# Patient Record
Sex: MALE | Employment: STUDENT
[De-identification: names, ages, dates, MRNs, and addresses within clinical notes are randomized per-mention and may not be internally consistent; named-entity substitution may affect disease eponyms.]

---
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
## 2022-05-31 ENCOUNTER — OPTICAL OFFICE (OUTPATIENT)
Dept: URBAN - NONMETROPOLITAN AREA CLINIC 4 | Facility: CLINIC | Age: 22
Setting detail: OPHTHALMOLOGY
End: 2022-05-31
Payer: COMMERCIAL

## 2022-05-31 ENCOUNTER — DOCTOR'S OFFICE (OUTPATIENT)
Dept: URBAN - NONMETROPOLITAN AREA CLINIC 1 | Facility: CLINIC | Age: 22
Setting detail: OPHTHALMOLOGY
End: 2022-05-31
Payer: COMMERCIAL

## 2022-05-31 DIAGNOSIS — H52.223: ICD-10-CM

## 2022-05-31 DIAGNOSIS — H52.13: ICD-10-CM

## 2022-05-31 PROCEDURE — 92014 COMPRE OPH EXAM EST PT 1/>: CPT | Performed by: OPTOMETRIST

## 2022-05-31 PROCEDURE — V2750 ANTI-REFLECTIVE COATING: HCPCS | Performed by: OPTOMETRIST

## 2022-05-31 PROCEDURE — V2744 TINT PHOTOCHROMATIC LENS/ES: HCPCS | Performed by: OPTOMETRIST

## 2022-05-31 PROCEDURE — V2784 LENS POLYCARB OR EQUAL: HCPCS | Performed by: OPTOMETRIST

## 2022-05-31 PROCEDURE — V2103 SPHEROCYLINDR 4.00D/12-2.00D: HCPCS | Performed by: OPTOMETRIST

## 2022-05-31 PROCEDURE — V2020 VISION SVCS FRAMES PURCHASES: HCPCS | Performed by: OPTOMETRIST

## 2022-05-31 PROCEDURE — V2025 EYEGLASSES DELUX FRAMES: HCPCS | Performed by: OPTOMETRIST

## 2022-05-31 PROCEDURE — V2107 SPHEROCYLINDER 4.25D/12-2D: HCPCS | Performed by: OPTOMETRIST

## 2022-05-31 ASSESSMENT — REFRACTION_MANIFEST
OU_VA: 20/20
OD_VA2: 20/80-2
OS_CYLINDER: -0.50
OS_AXIS: 175
OD_VA1: 20/80-2NI
OS_VA1: 20/20
OD_AXIS: 150
OS_SPHERE: -5.00
OS_VA2: 20/20
OD_SPHERE: -0.75
OD_CYLINDER: -1.25

## 2022-05-31 ASSESSMENT — CONFRONTATIONAL VISUAL FIELD TEST (CVF)
OD_FINDINGS: FULL
OS_FINDINGS: FULL

## 2022-05-31 ASSESSMENT — REFRACTION_AUTOREFRACTION
OS_AXIS: 018
OS_SPHERE: -5.00
OS_CYLINDER: -0.75
OD_SPHERE: -1.50
OD_AXIS: 160
OD_CYLINDER: -2.25

## 2022-05-31 ASSESSMENT — REFRACTION_CURRENTRX
OS_SPHERE: -5.00
OD_AXIS: 151
OS_VPRISM_DIRECTION: SV
OD_SPHERE: -0.75
OS_OVR_VA: 20/
OS_CYLINDER: -0.50
OD_OVR_VA: 20/
OD_VPRISM_DIRECTION: SV
OS_AXIS: 177
OD_CYLINDER: -1.25

## 2022-05-31 ASSESSMENT — TONOMETRY
OD_IOP_MMHG: 12
OS_IOP_MMHG: 12

## 2022-05-31 ASSESSMENT — SPHEQUIV_DERIVED
OD_SPHEQUIV: -1.375
OD_SPHEQUIV: -2.625
OS_SPHEQUIV: -5.25
OS_SPHEQUIV: -5.375

## 2022-05-31 ASSESSMENT — VISUAL ACUITY
OS_BCVA: 20/150+1
OD_BCVA: 20/25

## 2023-06-02 ENCOUNTER — DOCTOR'S OFFICE (OUTPATIENT)
Dept: URBAN - NONMETROPOLITAN AREA CLINIC 1 | Facility: CLINIC | Age: 23
Setting detail: OPHTHALMOLOGY
End: 2023-06-02
Payer: COMMERCIAL

## 2023-06-02 VITALS — HEIGHT: 60 IN

## 2023-06-02 DIAGNOSIS — H52.13: ICD-10-CM

## 2023-06-02 PROBLEM — E11.9 DIABETES TYPE 2 NO RETINOPATHY: Status: ACTIVE | Noted: 2023-06-02

## 2023-06-02 LAB
LEFT EYE DIABETIC RETINOPATHY: NORMAL
RIGHT EYE DIABETIC RETINOPATHY: NORMAL

## 2023-06-02 PROCEDURE — 92014 COMPRE OPH EXAM EST PT 1/>: CPT | Performed by: OPTOMETRIST

## 2023-06-02 ASSESSMENT — REFRACTION_AUTOREFRACTION
OS_SPHERE: -5.00
OD_CYLINDER: -0.75
OS_AXIS: 151
OS_CYLINDER: -0.50
OD_SPHERE: -1.25
OD_AXIS: 126

## 2023-06-02 ASSESSMENT — REFRACTION_MANIFEST
OD_SPHERE: -0.75
OS_VA2: 20/20
OD_VA2: 20/80-2
OS_AXIS: 175
OU_VA: 20/20
OS_CYLINDER: -0.50
OD_AXIS: 150
OS_VA1: 20/20
OD_VA1: 20/80-2
OD_CYLINDER: -1.00
OS_SPHERE: -5.00

## 2023-06-02 ASSESSMENT — CONFRONTATIONAL VISUAL FIELD TEST (CVF)
OS_FINDINGS: FULL
OD_FINDINGS: FULL

## 2023-06-02 ASSESSMENT — REFRACTION_CURRENTRX
OS_CYLINDER: -0.50
OD_CYLINDER: -1.25
OS_AXIS: 177
OS_SPHERE: -5.00
OS_VPRISM_DIRECTION: SV
OD_SPHERE: -0.75
OD_OVR_VA: 20/
OS_OVR_VA: 20/
OD_AXIS: 151
OD_VPRISM_DIRECTION: SV

## 2023-06-02 ASSESSMENT — SPHEQUIV_DERIVED
OS_SPHEQUIV: -5.25
OD_SPHEQUIV: -1.625
OD_SPHEQUIV: -1.25
OS_SPHEQUIV: -5.25

## 2023-06-02 ASSESSMENT — VISUAL ACUITY
OD_BCVA: 20/20
OS_BCVA: 20/200+1

## 2023-06-02 ASSESSMENT — TONOMETRY
OD_IOP_MMHG: 14
OS_IOP_MMHG: 14

## 2023-08-21 ENCOUNTER — HOSPITAL ENCOUNTER (INPATIENT)
Facility: HOSPITAL | Age: 23
LOS: 4 days | Discharge: HOME | DRG: 469 | End: 2023-08-25
Attending: EMERGENCY MEDICINE | Admitting: FAMILY MEDICINE
Payer: COMMERCIAL

## 2023-08-21 DIAGNOSIS — R73.9 HYPERGLYCEMIA: Primary | ICD-10-CM

## 2023-08-21 DIAGNOSIS — R55 SYNCOPE: ICD-10-CM

## 2023-08-21 DIAGNOSIS — N17.9 AKI (ACUTE KIDNEY INJURY) (HCC): ICD-10-CM

## 2023-08-21 DIAGNOSIS — E11.9 TYPE 2 DIABETES MELLITUS (HCC): ICD-10-CM

## 2023-08-21 DIAGNOSIS — R74.01 TRANSAMINITIS: ICD-10-CM

## 2023-08-21 DIAGNOSIS — E11.00 HYPEROSMOLAR HYPERGLYCEMIC STATE (HHS) (HCC): ICD-10-CM

## 2023-08-21 DIAGNOSIS — E87.0 HYPERNATREMIA: ICD-10-CM

## 2023-08-21 DIAGNOSIS — E23.2 DI (DIABETES INSIPIDUS) (HCC): ICD-10-CM

## 2023-08-21 LAB
ALBUMIN SERPL BCP-MCNC: 4.5 G/DL (ref 3.5–5)
ALP SERPL-CCNC: 87 U/L (ref 34–104)
ALT SERPL W P-5'-P-CCNC: 85 U/L (ref 7–52)
ANION GAP SERPL CALCULATED.3IONS-SCNC: 13 MMOL/L
AST SERPL W P-5'-P-CCNC: 61 U/L (ref 13–39)
BACTERIA UR QL AUTO: NORMAL /HPF
BASE EX.OXY STD BLDV CALC-SCNC: 82.3 % (ref 60–80)
BASE EXCESS BLDV CALC-SCNC: 2.1 MMOL/L
BASOPHILS # BLD AUTO: 0.11 THOUSANDS/ÂΜL (ref 0–0.1)
BASOPHILS NFR BLD AUTO: 1 % (ref 0–1)
BETA-HYDROXYBUTYRATE: 0.5 MMOL/L
BILIRUB SERPL-MCNC: 0.59 MG/DL (ref 0.2–1)
BILIRUB UR QL STRIP: NEGATIVE
BUN SERPL-MCNC: 23 MG/DL (ref 5–25)
CALCIUM SERPL-MCNC: 10.3 MG/DL (ref 8.4–10.2)
CARDIAC TROPONIN I PNL SERPL HS: 7 NG/L
CHLORIDE SERPL-SCNC: 96 MMOL/L (ref 96–108)
CLARITY UR: CLEAR
CO2 SERPL-SCNC: 26 MMOL/L (ref 21–32)
COLOR UR: YELLOW
CREAT SERPL-MCNC: 1.41 MG/DL (ref 0.6–1.3)
EOSINOPHIL # BLD AUTO: 0.16 THOUSAND/ÂΜL (ref 0–0.61)
EOSINOPHIL NFR BLD AUTO: 2 % (ref 0–6)
ERYTHROCYTE [DISTWIDTH] IN BLOOD BY AUTOMATED COUNT: 12.2 % (ref 11.6–15.1)
GFR SERPL CREATININE-BSD FRML MDRD: 71 ML/MIN/1.73SQ M
GLUCOSE SERPL-MCNC: 900 MG/DL (ref 65–140)
GLUCOSE SERPL-MCNC: >500 MG/DL (ref 65–140)
GLUCOSE SERPL-MCNC: >500 MG/DL (ref 65–140)
GLUCOSE UR STRIP-MCNC: ABNORMAL MG/DL
HCO3 BLDV-SCNC: 27.6 MMOL/L (ref 24–30)
HCT VFR BLD AUTO: 47.2 % (ref 36.5–49.3)
HGB BLD-MCNC: 16.2 G/DL (ref 12–17)
HGB UR QL STRIP.AUTO: ABNORMAL
IMM GRANULOCYTES # BLD AUTO: 0.04 THOUSAND/UL (ref 0–0.2)
IMM GRANULOCYTES NFR BLD AUTO: 0 % (ref 0–2)
KETONES UR STRIP-MCNC: NEGATIVE MG/DL
LEUKOCYTE ESTERASE UR QL STRIP: ABNORMAL
LYMPHOCYTES # BLD AUTO: 3.16 THOUSANDS/ÂΜL (ref 0.6–4.47)
LYMPHOCYTES NFR BLD AUTO: 32 % (ref 14–44)
MAGNESIUM SERPL-MCNC: 2.5 MG/DL (ref 1.9–2.7)
MCH RBC QN AUTO: 31.8 PG (ref 26.8–34.3)
MCHC RBC AUTO-ENTMCNC: 34.3 G/DL (ref 31.4–37.4)
MCV RBC AUTO: 93 FL (ref 82–98)
MONOCYTES # BLD AUTO: 0.97 THOUSAND/ÂΜL (ref 0.17–1.22)
MONOCYTES NFR BLD AUTO: 10 % (ref 4–12)
NEUTROPHILS # BLD AUTO: 5.55 THOUSANDS/ÂΜL (ref 1.85–7.62)
NEUTS SEG NFR BLD AUTO: 55 % (ref 43–75)
NITRITE UR QL STRIP: NEGATIVE
NON-SQ EPI CELLS URNS QL MICRO: NORMAL /HPF
NRBC BLD AUTO-RTO: 0 /100 WBCS
O2 CT BLDV-SCNC: 19.7 ML/DL
PCO2 BLDV: 45.6 MM HG (ref 42–50)
PH BLDV: 7.4 [PH] (ref 7.3–7.4)
PH UR STRIP.AUTO: 5.5 [PH]
PLATELET # BLD AUTO: 231 THOUSANDS/UL (ref 149–390)
PMV BLD AUTO: 11.4 FL (ref 8.9–12.7)
PO2 BLDV: 48 MM HG (ref 35–45)
POTASSIUM SERPL-SCNC: 3.9 MMOL/L (ref 3.5–5.3)
PROT SERPL-MCNC: 8.1 G/DL (ref 6.4–8.4)
PROT UR STRIP-MCNC: NEGATIVE MG/DL
RBC # BLD AUTO: 5.09 MILLION/UL (ref 3.88–5.62)
RBC #/AREA URNS AUTO: NORMAL /HPF
SODIUM SERPL-SCNC: 135 MMOL/L (ref 135–147)
SP GR UR STRIP.AUTO: <=1.005 (ref 1–1.03)
UROBILINOGEN UR QL STRIP.AUTO: 0.2 E.U./DL
WBC # BLD AUTO: 9.99 THOUSAND/UL (ref 4.31–10.16)
WBC #/AREA URNS AUTO: NORMAL /HPF

## 2023-08-21 PROCEDURE — 84484 ASSAY OF TROPONIN QUANT: CPT | Performed by: EMERGENCY MEDICINE

## 2023-08-21 PROCEDURE — 36415 COLL VENOUS BLD VENIPUNCTURE: CPT | Performed by: EMERGENCY MEDICINE

## 2023-08-21 PROCEDURE — 83735 ASSAY OF MAGNESIUM: CPT | Performed by: EMERGENCY MEDICINE

## 2023-08-21 PROCEDURE — 81001 URINALYSIS AUTO W/SCOPE: CPT | Performed by: EMERGENCY MEDICINE

## 2023-08-21 PROCEDURE — 82010 KETONE BODYS QUAN: CPT | Performed by: EMERGENCY MEDICINE

## 2023-08-21 PROCEDURE — 93005 ELECTROCARDIOGRAM TRACING: CPT

## 2023-08-21 PROCEDURE — 96367 TX/PROPH/DG ADDL SEQ IV INF: CPT

## 2023-08-21 PROCEDURE — 82948 REAGENT STRIP/BLOOD GLUCOSE: CPT

## 2023-08-21 PROCEDURE — 82805 BLOOD GASES W/O2 SATURATION: CPT | Performed by: EMERGENCY MEDICINE

## 2023-08-21 PROCEDURE — 96365 THER/PROPH/DIAG IV INF INIT: CPT

## 2023-08-21 PROCEDURE — 85025 COMPLETE CBC W/AUTO DIFF WBC: CPT | Performed by: EMERGENCY MEDICINE

## 2023-08-21 PROCEDURE — 80053 COMPREHEN METABOLIC PANEL: CPT | Performed by: EMERGENCY MEDICINE

## 2023-08-21 PROCEDURE — 99291 CRITICAL CARE FIRST HOUR: CPT | Performed by: EMERGENCY MEDICINE

## 2023-08-21 PROCEDURE — 99284 EMERGENCY DEPT VISIT MOD MDM: CPT

## 2023-08-21 RX ADMIN — SODIUM CHLORIDE 10 UNITS/HR: 9 INJECTION, SOLUTION INTRAVENOUS at 23:07

## 2023-08-21 RX ADMIN — SODIUM CHLORIDE, SODIUM LACTATE, POTASSIUM CHLORIDE, AND CALCIUM CHLORIDE 1000 ML: .6; .31; .03; .02 INJECTION, SOLUTION INTRAVENOUS at 21:51

## 2023-08-21 RX ADMIN — INSULIN HUMAN 10 UNITS: 100 INJECTION, SOLUTION PARENTERAL at 23:09

## 2023-08-21 RX ADMIN — SODIUM CHLORIDE 1000 ML: 0.9 INJECTION, SOLUTION INTRAVENOUS at 23:04

## 2023-08-22 PROBLEM — R00.0 SINUS TACHYCARDIA: Status: ACTIVE | Noted: 2023-08-22

## 2023-08-22 PROBLEM — R55 NEAR SYNCOPE: Status: ACTIVE | Noted: 2023-08-22

## 2023-08-22 LAB
ALBUMIN SERPL BCP-MCNC: 3.8 G/DL (ref 3.5–5)
ALP SERPL-CCNC: 74 U/L (ref 34–104)
ALT SERPL W P-5'-P-CCNC: 65 U/L (ref 7–52)
ANION GAP SERPL CALCULATED.3IONS-SCNC: 7 MMOL/L
ANION GAP SERPL CALCULATED.3IONS-SCNC: 8 MMOL/L
AST SERPL W P-5'-P-CCNC: 49 U/L (ref 13–39)
ATRIAL RATE: 122 BPM
BASOPHILS # BLD AUTO: 0.09 THOUSANDS/ÂΜL (ref 0–0.1)
BASOPHILS NFR BLD AUTO: 1 % (ref 0–1)
BILIRUB SERPL-MCNC: 0.49 MG/DL (ref 0.2–1)
BUN SERPL-MCNC: 12 MG/DL (ref 5–25)
BUN SERPL-MCNC: 14 MG/DL (ref 5–25)
CALCIUM SERPL-MCNC: 8.9 MG/DL (ref 8.4–10.2)
CALCIUM SERPL-MCNC: 9.1 MG/DL (ref 8.4–10.2)
CHLORIDE SERPL-SCNC: 110 MMOL/L (ref 96–108)
CHLORIDE SERPL-SCNC: 112 MMOL/L (ref 96–108)
CO2 SERPL-SCNC: 29 MMOL/L (ref 21–32)
CO2 SERPL-SCNC: 31 MMOL/L (ref 21–32)
CREAT SERPL-MCNC: 0.89 MG/DL (ref 0.6–1.3)
CREAT SERPL-MCNC: 0.92 MG/DL (ref 0.6–1.3)
EOSINOPHIL # BLD AUTO: 0.27 THOUSAND/ÂΜL (ref 0–0.61)
EOSINOPHIL NFR BLD AUTO: 3 % (ref 0–6)
ERYTHROCYTE [DISTWIDTH] IN BLOOD BY AUTOMATED COUNT: 12.3 % (ref 11.6–15.1)
GFR SERPL CREATININE-BSD FRML MDRD: 119 ML/MIN/1.73SQ M
GFR SERPL CREATININE-BSD FRML MDRD: 123 ML/MIN/1.73SQ M
GLUCOSE SERPL-MCNC: 253 MG/DL (ref 65–140)
GLUCOSE SERPL-MCNC: 262 MG/DL (ref 65–140)
GLUCOSE SERPL-MCNC: 263 MG/DL (ref 65–140)
GLUCOSE SERPL-MCNC: 267 MG/DL (ref 65–140)
GLUCOSE SERPL-MCNC: 291 MG/DL (ref 65–140)
GLUCOSE SERPL-MCNC: 332 MG/DL (ref 65–140)
GLUCOSE SERPL-MCNC: 335 MG/DL (ref 65–140)
GLUCOSE SERPL-MCNC: 341 MG/DL (ref 65–140)
GLUCOSE SERPL-MCNC: 361 MG/DL (ref 65–140)
GLUCOSE SERPL-MCNC: 399 MG/DL (ref 65–140)
GLUCOSE SERPL-MCNC: 450 MG/DL (ref 65–140)
GLUCOSE SERPL-MCNC: >500 MG/DL (ref 65–140)
HCT VFR BLD AUTO: 42.9 % (ref 36.5–49.3)
HGB BLD-MCNC: 14.4 G/DL (ref 12–17)
IMM GRANULOCYTES # BLD AUTO: 0.02 THOUSAND/UL (ref 0–0.2)
IMM GRANULOCYTES NFR BLD AUTO: 0 % (ref 0–2)
LYMPHOCYTES # BLD AUTO: 3.35 THOUSANDS/ÂΜL (ref 0.6–4.47)
LYMPHOCYTES NFR BLD AUTO: 37 % (ref 14–44)
MCH RBC QN AUTO: 31.3 PG (ref 26.8–34.3)
MCHC RBC AUTO-ENTMCNC: 33.6 G/DL (ref 31.4–37.4)
MCV RBC AUTO: 93 FL (ref 82–98)
MONOCYTES # BLD AUTO: 1 THOUSAND/ÂΜL (ref 0.17–1.22)
MONOCYTES NFR BLD AUTO: 11 % (ref 4–12)
NEUTROPHILS # BLD AUTO: 4.38 THOUSANDS/ÂΜL (ref 1.85–7.62)
NEUTS SEG NFR BLD AUTO: 48 % (ref 43–75)
NRBC BLD AUTO-RTO: 0 /100 WBCS
P AXIS: 16 DEGREES
PLATELET # BLD AUTO: 181 THOUSANDS/UL (ref 149–390)
PMV BLD AUTO: 11.2 FL (ref 8.9–12.7)
POTASSIUM SERPL-SCNC: 3.4 MMOL/L (ref 3.5–5.3)
POTASSIUM SERPL-SCNC: 3.5 MMOL/L (ref 3.5–5.3)
PR INTERVAL: 122 MS
PROT SERPL-MCNC: 6.6 G/DL (ref 6.4–8.4)
QRS AXIS: 33 DEGREES
QRSD INTERVAL: 76 MS
QT INTERVAL: 302 MS
QTC INTERVAL: 430 MS
RBC # BLD AUTO: 4.6 MILLION/UL (ref 3.88–5.62)
SODIUM SERPL-SCNC: 146 MMOL/L (ref 135–147)
SODIUM SERPL-SCNC: 151 MMOL/L (ref 135–147)
T WAVE AXIS: 27 DEGREES
TSH SERPL DL<=0.05 MIU/L-ACNC: 3.12 UIU/ML (ref 0.45–4.5)
VENTRICULAR RATE: 122 BPM
WBC # BLD AUTO: 9.11 THOUSAND/UL (ref 4.31–10.16)

## 2023-08-22 PROCEDURE — 80048 BASIC METABOLIC PNL TOTAL CA: CPT | Performed by: FAMILY MEDICINE

## 2023-08-22 PROCEDURE — 99223 1ST HOSP IP/OBS HIGH 75: CPT | Performed by: FAMILY MEDICINE

## 2023-08-22 PROCEDURE — 80053 COMPREHEN METABOLIC PANEL: CPT

## 2023-08-22 PROCEDURE — 82948 REAGENT STRIP/BLOOD GLUCOSE: CPT

## 2023-08-22 PROCEDURE — 84443 ASSAY THYROID STIM HORMONE: CPT

## 2023-08-22 PROCEDURE — 85025 COMPLETE CBC W/AUTO DIFF WBC: CPT

## 2023-08-22 RX ORDER — INSULIN GLARGINE 100 [IU]/ML
13 INJECTION, SOLUTION SUBCUTANEOUS ONCE
Status: COMPLETED | OUTPATIENT
Start: 2023-08-22 | End: 2023-08-22

## 2023-08-22 RX ORDER — INSULIN LISPRO 100 [IU]/ML
2-12 INJECTION, SOLUTION INTRAVENOUS; SUBCUTANEOUS
Status: DISCONTINUED | OUTPATIENT
Start: 2023-08-22 | End: 2023-08-23

## 2023-08-22 RX ORDER — INSULIN GLARGINE 100 [IU]/ML
36 INJECTION, SOLUTION SUBCUTANEOUS
Status: DISCONTINUED | OUTPATIENT
Start: 2023-08-22 | End: 2023-08-22

## 2023-08-22 RX ORDER — INSULIN GLARGINE 100 [IU]/ML
13 INJECTION, SOLUTION SUBCUTANEOUS
Status: DISCONTINUED | OUTPATIENT
Start: 2023-08-22 | End: 2023-08-22

## 2023-08-22 RX ORDER — INSULIN LISPRO 100 [IU]/ML
2-12 INJECTION, SOLUTION INTRAVENOUS; SUBCUTANEOUS
Status: DISCONTINUED | OUTPATIENT
Start: 2023-08-22 | End: 2023-08-22

## 2023-08-22 RX ORDER — INSULIN LISPRO 100 [IU]/ML
9 INJECTION, SOLUTION INTRAVENOUS; SUBCUTANEOUS
Status: DISCONTINUED | OUTPATIENT
Start: 2023-08-22 | End: 2023-08-22

## 2023-08-22 RX ORDER — POTASSIUM CHLORIDE 20 MEQ/1
40 TABLET, EXTENDED RELEASE ORAL ONCE
Status: COMPLETED | OUTPATIENT
Start: 2023-08-22 | End: 2023-08-22

## 2023-08-22 RX ORDER — ACETAMINOPHEN 325 MG/1
650 TABLET ORAL EVERY 6 HOURS PRN
Status: DISCONTINUED | OUTPATIENT
Start: 2023-08-22 | End: 2023-08-25 | Stop reason: HOSPADM

## 2023-08-22 RX ORDER — INSULIN LISPRO 100 [IU]/ML
9 INJECTION, SOLUTION INTRAVENOUS; SUBCUTANEOUS
Status: DISCONTINUED | OUTPATIENT
Start: 2023-08-23 | End: 2023-08-22

## 2023-08-22 RX ORDER — INSULIN GLARGINE 100 [IU]/ML
45 INJECTION, SOLUTION SUBCUTANEOUS
Status: DISCONTINUED | OUTPATIENT
Start: 2023-08-23 | End: 2023-08-23

## 2023-08-22 RX ORDER — POTASSIUM CHLORIDE 20 MEQ/1
20 TABLET, EXTENDED RELEASE ORAL ONCE
Status: COMPLETED | OUTPATIENT
Start: 2023-08-22 | End: 2023-08-22

## 2023-08-22 RX ORDER — INSULIN LISPRO 100 [IU]/ML
15 INJECTION, SOLUTION INTRAVENOUS; SUBCUTANEOUS
Status: DISCONTINUED | OUTPATIENT
Start: 2023-08-23 | End: 2023-08-23

## 2023-08-22 RX ORDER — INSULIN LISPRO 100 [IU]/ML
1-6 INJECTION, SOLUTION INTRAVENOUS; SUBCUTANEOUS
Status: DISCONTINUED | OUTPATIENT
Start: 2023-08-22 | End: 2023-08-22

## 2023-08-22 RX ORDER — INSULIN LISPRO 100 [IU]/ML
10 INJECTION, SOLUTION INTRAVENOUS; SUBCUTANEOUS
Status: DISCONTINUED | OUTPATIENT
Start: 2023-08-22 | End: 2023-08-22

## 2023-08-22 RX ORDER — INSULIN LISPRO 100 [IU]/ML
1-6 INJECTION, SOLUTION INTRAVENOUS; SUBCUTANEOUS
Status: DISCONTINUED | OUTPATIENT
Start: 2023-08-22 | End: 2023-08-23

## 2023-08-22 RX ORDER — DEXTROSE AND SODIUM CHLORIDE 5; .45 G/100ML; G/100ML
200 INJECTION, SOLUTION INTRAVENOUS CONTINUOUS
Status: DISCONTINUED | OUTPATIENT
Start: 2023-08-22 | End: 2023-08-22

## 2023-08-22 RX ORDER — INSULIN LISPRO 100 [IU]/ML
12 INJECTION, SOLUTION INTRAVENOUS; SUBCUTANEOUS
Status: DISCONTINUED | OUTPATIENT
Start: 2023-08-23 | End: 2023-08-22

## 2023-08-22 RX ORDER — TESTOSTERONE CYPIONATE 100 MG/ML
100 VIAL (ML) INTRAMUSCULAR
COMMUNITY
Start: 2019-09-19

## 2023-08-22 RX ORDER — SODIUM CHLORIDE 450 MG/100ML
150 INJECTION, SOLUTION INTRAVENOUS CONTINUOUS
Status: DISCONTINUED | OUTPATIENT
Start: 2023-08-22 | End: 2023-08-24

## 2023-08-22 RX ORDER — SODIUM CHLORIDE, SODIUM GLUCONATE, SODIUM ACETATE, POTASSIUM CHLORIDE, MAGNESIUM CHLORIDE, SODIUM PHOSPHATE, DIBASIC, AND POTASSIUM PHOSPHATE .53; .5; .37; .037; .03; .012; .00082 G/100ML; G/100ML; G/100ML; G/100ML; G/100ML; G/100ML; G/100ML
150 INJECTION, SOLUTION INTRAVENOUS CONTINUOUS
Status: DISCONTINUED | OUTPATIENT
Start: 2023-08-22 | End: 2023-08-22

## 2023-08-22 RX ORDER — INSULIN GLARGINE 100 [IU]/ML
30 INJECTION, SOLUTION SUBCUTANEOUS
Status: DISCONTINUED | OUTPATIENT
Start: 2023-08-22 | End: 2023-08-22

## 2023-08-22 RX ORDER — ONDANSETRON 2 MG/ML
4 INJECTION INTRAMUSCULAR; INTRAVENOUS EVERY 6 HOURS PRN
Status: DISCONTINUED | OUTPATIENT
Start: 2023-08-22 | End: 2023-08-25 | Stop reason: HOSPADM

## 2023-08-22 RX ADMIN — POTASSIUM CHLORIDE 40 MEQ: 1500 TABLET, EXTENDED RELEASE ORAL at 07:50

## 2023-08-22 RX ADMIN — INSULIN GLARGINE 36 UNITS: 100 INJECTION, SOLUTION SUBCUTANEOUS at 21:57

## 2023-08-22 RX ADMIN — SODIUM CHLORIDE, SODIUM GLUCONATE, SODIUM ACETATE, POTASSIUM CHLORIDE, MAGNESIUM CHLORIDE, SODIUM PHOSPHATE, DIBASIC, AND POTASSIUM PHOSPHATE 150 ML/HR: .53; .5; .37; .037; .03; .012; .00082 INJECTION, SOLUTION INTRAVENOUS at 01:07

## 2023-08-22 RX ADMIN — INSULIN LISPRO 6 UNITS: 100 INJECTION, SOLUTION INTRAVENOUS; SUBCUTANEOUS at 09:12

## 2023-08-22 RX ADMIN — INSULIN LISPRO 9 UNITS: 100 INJECTION, SOLUTION INTRAVENOUS; SUBCUTANEOUS at 12:10

## 2023-08-22 RX ADMIN — INSULIN LISPRO 10 UNITS: 100 INJECTION, SOLUTION INTRAVENOUS; SUBCUTANEOUS at 16:45

## 2023-08-22 RX ADMIN — SODIUM CHLORIDE 125 ML/HR: 0.45 INJECTION, SOLUTION INTRAVENOUS at 09:11

## 2023-08-22 RX ADMIN — DEXTROSE AND SODIUM CHLORIDE 200 ML/HR: 5; .45 INJECTION, SOLUTION INTRAVENOUS at 07:50

## 2023-08-22 RX ADMIN — INSULIN GLARGINE 13 UNITS: 100 INJECTION, SOLUTION SUBCUTANEOUS at 09:10

## 2023-08-22 RX ADMIN — SODIUM CHLORIDE 125 ML/HR: 0.45 INJECTION, SOLUTION INTRAVENOUS at 17:10

## 2023-08-22 RX ADMIN — INSULIN LISPRO 6 UNITS: 100 INJECTION, SOLUTION INTRAVENOUS; SUBCUTANEOUS at 16:45

## 2023-08-22 RX ADMIN — INSULIN LISPRO 3 UNITS: 100 INJECTION, SOLUTION INTRAVENOUS; SUBCUTANEOUS at 21:57

## 2023-08-22 RX ADMIN — POTASSIUM CHLORIDE 20 MEQ: 1500 TABLET, EXTENDED RELEASE ORAL at 01:18

## 2023-08-22 RX ADMIN — INSULIN LISPRO 5 UNITS: 100 INJECTION, SOLUTION INTRAVENOUS; SUBCUTANEOUS at 12:10

## 2023-08-22 NOTE — CASE MANAGEMENT
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[address])    • Transaminitis    • Schwannoma    • JOSH (acute kidney injury) ([address])       LOS (days): 1  Geometric Mean LOS (GMLOS) (days):   Days to GMLOS:     OBJECTIVE:    Risk of Unplanned Readmission Score: 6.03         Current admission status: Inpatient  Referral Reason:  (post acute care needs)    Preferred Pharmacy:   [address] [place] [address]  Phone: [phone] Fax: [phone]    Primary Care Provider: Shahriar Egan MD    Primary Insurance: [facility]  Secondary Insurance:       Admit with HHS, currently on IV fluids    CM met with patient and his Mom at the bedside,baseline information  was obtained. CM discussed the role of CM in helping the patient develop a discharge plan and assist the patient in carry out their plan. ASSESSMENT:  Active Health Care Proxies    There are no active Health Care Proxies on file.        Advance Directives  Does patient have a [facility]?: No  Was patient offered paperwork?: No  Does patient currently have a Health Care decision maker?: Yes, please see Health Care Proxy section  Does patient have Advance Directives?: No  Was patient offered paperwork?: No  Primary Contact: Jackie Marie (Mother)   [phone]         Readmission Root Cause  30 Day Readmission: No    Patient Information  Admitted from[de-identified] Home  Mental Status: Alert  During Assessment patient was accompanied by: Parent  Assessment information provided by[de-identified] Patient  Primary Caregiver: Self  Support Systems: Parent, Family members  [place] of Residence: [address] [place] [address] do you live in?: [facility]/ [facility] entry access options. Select all that apply.: Stairs  Number of steps to enter home.: 1  Type of Current Residence: 3 story home  Upon entering residence, is there a bedroom on the main floor (no further steps)?: No  A bedroom is located on the following floor levels of residence (select all that apply):: 2nd Floor  Upon entering residence, is there a bathroom on the main floor (no further steps)?: No  Indicate which floors of current residence have a bathroom (select all the apply):: 2nd Floor  Number of steps to 2nd floor from main floor: One Flight  In the last 12 months, was there a time when you were not able to pay the mortgage or rent on time?: No  In the last 12 months, how many places have you lived?: 1  In the last 12 months, was there a time when you did not have a steady place to sleep or slept in a shelter (including now)?: No  Homeless/housing insecurity resource given?: No  Living Arrangements: Lives w/ Parent(s), Other (Comment) (About to start his Jr year at [employer])  Is patient a ?: No    Activities of Daily Living Prior to Admission  Functional Status: Independent  Completes ADLs independently?: Yes  Ambulates independently?: Yes  Does patient use assisted devices?: No  Does patient currently own DME?: No  Does patient have a history of Outpatient Therapy (PT/OT)?: No  Does the patient have a history of Short-Term Rehab?: No  Does patient have a history of HHC?: No  Does patient currently have [facility]?: No         Patient Information Continued  Income Source:  Other (Comment) (Student)  Does patient have prescription coverage?: Yes  Within the past 12 months, you worried that your food would run out before you got the money to buy more.: Never true  Within the past 12 months, the food you bought just didn't last and you didn't have money to get more.: Never true  Food insecurity resource given?: No  Does patient receive dialysis treatments?: No  Does patient have a history of substance abuse?: No  Does patient have a history of Mental Health Diagnosis?: No         Means of Transportation  Means of Transport to Appts[de-identified] Drives Self  In the past 12 months, has lack of transportation kept you from medical appointments or from getting medications?: No  In the past 12 months, has lack of transportation kept you from meetings, work, or from getting things needed for daily living?: No  Was application for public transport provided?: No    Patient is to start his [employer] year at [employer] this week. He does have a endocrine MD at [facility] Dr Simone Lowe who has been managing his growth hormone. PCP is Dr Brittany Chew, Mother is aware patient will be transition to an adult PCP in the next few months, as pt will be turning 26years old. DISCHARGE DETAILS:    Discharge planning discussed with[de-identified] patient and mother  Freedom of Choice: Yes     CM contacted family/caregiver?: Yes             Contacts  Patient Contacts: Hyun Khan, Mother  Relationship to Patient[de-identified] Family  Contact Method: In Person  Reason/Outcome: Continuity of Care, Discharge [facility]         Is the patient interested in [address] at discharge?: No    DME Referral Provided  Referral made for DME?: No              Treatment Team Recommendation: Home  Discharge Destination Plan[de-identified] Home         CM will follow for his dc needs. ? Needing insulin and a glucometer. Per his insurance glucometer will be One touch.

## 2023-08-22 NOTE — ASSESSMENT & PLAN NOTE
· Baseline appears to be 0.7 , creat 1.41 on admission  · Suspect due to dehydration in the setting of HHS. Also on Metformin.    · Continue IV fluid hydration   · Trend labs   · Urine retention protocol   · Avoid nephrotoxins, hypotension

## 2023-08-22 NOTE — ASSESSMENT & PLAN NOTE
· Follows with [facility] endocrinology   · Takes testosterone injections 2x monthly - hold while inpatient

## 2023-08-22 NOTE — ASSESSMENT & PLAN NOTE
· -120 . EKG shows sinus tachycardia   · Patient denies chest pain , palpitation, sob. · Afebrile.  UA negative for infection  · Suspect from dehydration - continue IVF hydration   · Check TSH , covid

## 2023-08-22 NOTE — ASSESSMENT & PLAN NOTE
· Patient reports near syncopal event while outside watching a baseball game. · EKG sinus tachy -asymptomatic   · No neuro deficit on exam.   · Suspect due to hyperglycemia and dehydration   · Continue to correct hyperglycemia. Continue IVF hydration .

## 2023-08-22 NOTE — UTILIZATION REVIEW
NOTIFICATION OF INPATIENT ADMISSION   AUTHORIZATION REQUEST   SERVICING FACILITY:   [address]  Tax ID: [id number]  NPI: [id number] ATTENDING PROVIDER:  Attending Name and NPI#: Cate Herring Md [[id number]]  Address: [address] [place] [address]  Phone: [phone]   ADMISSION INFORMATION:  Place of Service: Inpatient [address]  Place of Service Code: 21  Inpatient Admission Date/Time: 8/21/23 11:30 PM  Discharge Date/Time: No discharge date for patient encounter. Admitting Diagnosis Code/Description:  Syncope [R55]  Fatigue [R53.83]  Hyperglycemia [R73.9]  JOSH (acute kidney injury) ([address]) [N17.9]     UTILIZATION REVIEW CONTACT:  Susie Albarran Utilization   Network Utilization Review Department  Phone: [phone]  Fax [phone]  Email: [email or web address]  Contact for approvals/pending authorizations, clinical reviews, and discharge. PHYSICIAN ADVISORY SERVICES:  Medical Necessity Denial & [facility] Review  Phone: [phone]  Fax: [phone]  Email: [email or web address]

## 2023-08-22 NOTE — ASSESSMENT & PLAN NOTE
Lab Results   Component Value Date    HGBA1C 8.8 (H) 07/03/2023       Recent Labs     08/21/23  2129 08/21/23  2306 08/22/23  0024 08/22/23  0101   POCGLU >500* >500* 450* 399*       Blood Sugar Average: Last 72 hrs:  (P) 424.5     · Presents after near syncopal episode while watching brothers soccer game. Reports fatigue and feeling generally unwell x 1 week. In ED, found to have blood sugar of 900. Hx DM2 on metformin for 2 years, does not check blood sugars at home. · Not in DKA- anion gap normal. VBG normal pH. · Continue insulin gtt   · S/P 2L IVF bolus. Continue aggressive IV fluid hydration .    · Consult endocrine   · Monitor blood sugars closely- have been improving

## 2023-08-22 NOTE — H&P
[facility]  H&P  Name: Kwabena Hernandez 23 y.o. male I MRN: [id number]  Unit/Bed#: [facility] I Date of Admission: 8/21/2023   Date of Service: 8/22/2023 I Hospital Day: 1      Assessment/Plan   * Hyperosmolar hyperglycemic state (HHS) [facility])  Assessment & Plan  Lab Results   Component Value Date    HGBA1C 8.8 (H) 07/03/2023       Recent Labs     08/21/23  2129 08/21/23  2306 08/22/23  0024 08/22/23  0101   POCGLU >500* >500* 450* 399*       Blood Sugar Average: Last 72 hrs:  (P) 424.5     · Presents after near syncopal episode while watching brothers soccer game. Reports fatigue and feeling generally unwell x 1 week. In ED, found to have blood sugar of 900. Hx DM2 on metformin for 2 years, does not check blood sugars at home. · Not in DKA- anion gap normal. VBG normal pH. · Continue insulin gtt   · S/P 2L IVF bolus. Continue aggressive IV fluid hydration . · Consult endocrine   · Monitor blood sugars closely- have been improving     JOSH (acute kidney injury) ([address])  Assessment & Plan  · Baseline appears to be 0.7 , creat 1.41 on admission  · Suspect due to dehydration in the setting of HHS. Also on Metformin. · Continue IV fluid hydration   · Trend labs   · Urine retention protocol   · Avoid nephrotoxins, hypotension      Near syncope  Assessment & Plan  · Patient reports near syncopal event while outside watching a baseball game. · EKG sinus tachy -asymptomatic   · No neuro deficit on exam.   · Suspect due to hyperglycemia and dehydration   · Continue to correct hyperglycemia. Continue IVF hydration . Sinus tachycardia  Assessment & Plan  · -120 . EKG shows sinus tachycardia   · Patient denies chest pain , palpitation, sob. · Afebrile.  UA negative for infection  · Suspect from dehydration - continue IVF hydration   · Check TSH , covid     Schwannoma  Assessment & Plan  · Hx schwannoma L3-L5 , s/p surgery in 2017   · Follows with neurosurgery Transaminitis  Assessment & Plan  · Mildly elevated AST/ALT , T bili normal (does appear improved from prior labs in 2021)  · Denies abdominal pain. Did have prior episode of nausea and vomiting . · Possible from testosterone use? 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 Code Status: Level 1 - Full Code   Discussion with family: Updated  (mother) at bedside. Anticipated Length of Stay: Patient will be admitted on an inpatient basis with an anticipated length of stay of greater than 2 midnights secondary to HHS, camryn - insulin gtt, ivf, labs. Total Time Spent on Date of Encounter in care of patient: 75 minutes This time was spent on one or more of the following: performing physical exam; counseling and coordination of care; obtaining or reviewing history; documenting in the medical record; reviewing/ordering tests, medications or procedures; communicating with other healthcare professionals and discussing with patient's family/caregivers. Chief Complaint: near syncope    History of Present Illness:  Jerry Swain is a 23 y.o. male with a PMH of type 2 diabetes, obesity, growth hormone deficiency, schwannoma, morning glory optic disc anomaly who presents after near syncopal episode at his brother's baseball game. Patient reports he developed blurry vision and family reports he "fell asleep" , patient denies any loss of consciousness or fall.   Mother reports they used grandfathers blood sugar monitor and the reading was "high ". patient reports for about 1 week he has been feeling fatigued and generally unwell, had episodes of nausea and vomiting. Does report polydipsia at baseline, not new or worsening. Reports diagnosed with diabetes 2 years ago, at that time started on metformin. Reports he does not check blood sugars and does not have a meter . Recently had HgbA1c completed but was not aware of results per mother. [facility] , not diabetes management. Patient denies fevers, chills, chest pain, sob, abdomdinal pain, diarrhea. Review of Systems:  Review of Systems   Constitutional: Positive for activity change and fatigue. Negative for chills and fever. Respiratory: Negative for cough and shortness of breath. Cardiovascular: Negative for chest pain, palpitations and leg swelling. Gastrointestinal: Positive for nausea and vomiting. Negative for abdominal pain and diarrhea. Endocrine: Positive for polydipsia. Genitourinary: Negative for difficulty urinating and dysuria. Neurological: Negative for dizziness, weakness, light-headedness, numbness and headaches. No blurry vision or neuro symptoms on exam   All other systems reviewed and are negative. Past Medical and Surgical History:   Past Medical History:   Diagnosis Date   • Diabetes mellitus ([address])        Past Surgical History:   Procedure Laterality Date   • BACK SURGERY         Meds/Allergies:  Prior to Admission medications    Medication Sig Start Date End Date Taking? Authorizing Provider   metFORMIN (GLUCOPHAGE) 500 mg tablet Take 500 mg by mouth 2 (two) times a day with meals   Yes Historical Provider, MD   Testosterone Cypionate 100 MG/ML SOLN 100 mg by Intramuscular (multi inj) route every 14 (fourteen) days 9/19/19  Yes Historical Provider, MD     I have reviewed home medications with patient personally.     Allergies: No Known Allergies    Social History:  Marital Status: Single     Patient Pre-hospital Living Situation: Home  Patient Pre-hospital Level of Mobility: walks  Patient Pre-hospital Diet Restrictions: none  Substance Use History:   Social History     Substance and Sexual Activity   Alcohol Use None     Social History     Tobacco Use   Smoking Status Never   Smokeless Tobacco Never     Social History     Substance and Sexual Activity   Drug Use Never       Family History:  History reviewed. No pertinent family history. Physical Exam:     Vitals:   Blood Pressure: 135/86 (08/22/23 0018)  Pulse: (!) 120 (08/22/23 0018)  Temperature: 98.8 °F (37.1 °C) (08/22/23 0018)  Temp Source: Temporal (08/22/23 0018)  Respirations: 18 (08/22/23 0018)  Height: 5' 11" (180.3 cm) (08/22/23 0018)  Weight - Scale: 109 kg (240 lb 11.9 oz) (08/22/23 0018)  SpO2: 92 % (08/22/23 0018)    Physical Exam  Vitals and nursing note reviewed. Constitutional:       General: He is not in acute distress. Appearance: Normal appearance. He is not ill-appearing, toxic-appearing or diaphoretic. HENT:      Head: Normocephalic and atraumatic. Mouth/Throat:      Mouth: Mucous membranes are dry. Cardiovascular:      Rate and Rhythm: Regular rhythm. Tachycardia present. Pulses: Normal pulses. Heart sounds: Normal heart sounds. Pulmonary:      Effort: Pulmonary effort is normal. No respiratory distress. Breath sounds: Normal breath sounds. No wheezing, rhonchi or rales. Abdominal:      General: Bowel sounds are normal. There is no distension. Palpations: Abdomen is soft. Tenderness: There is no abdominal tenderness. There is no guarding or rebound. Musculoskeletal:         General: Normal range of motion. Cervical back: Normal range of motion. Right lower leg: No edema. Left lower leg: No edema. Skin:     General: Skin is warm and dry. Neurological:      General: No focal deficit present. Mental Status: He is alert and oriented to person, place, and time.           Additional Data:     Lab Results:  Results from last 7 days   Lab Units 08/21/23  2147   WBC Thousand/uL 9.99   HEMOGLOBIN g/dL 16.2   HEMATOCRIT % 47.2   PLATELETS Thousands/uL 231   NEUTROS PCT % 55   LYMPHS PCT % 32   MONOS PCT % 10   EOS PCT % 2     Results from last 7 days   Lab Units 08/21/23  2147   SODIUM mmol/L 135   POTASSIUM mmol/L 3.9   CHLORIDE mmol/L 96   CO2 mmol/L 26   BUN mg/dL 23   CREATININE mg/dL 1.41*   ANION GAP mmol/L 13   CALCIUM mg/dL 10.3*   ALBUMIN g/dL 4.5   TOTAL BILIRUBIN mg/dL 0.59   ALK PHOS U/L 87   ALT U/L 85*   AST U/L 61*   GLUCOSE RANDOM mg/dL 900*         Results from last 7 days   Lab Units 08/22/23  0101 08/22/23  0024 08/21/23  2306 08/21/23  2129   POC GLUCOSE mg/dl 399* 450* >500* >500*               Lines/Drains:  Invasive Devices     Peripheral Intravenous Line  Duration           Peripheral IV 08/21/23 Dorsal (posterior); Left Hand <1 day    Peripheral IV 08/21/23 Right Antecubital <1 day                    Imaging: No pertinent imaging reviewed. No orders to display       EKG and Other Studies Reviewed on Admission:   · EKG: Sinus Tachycardia. HR 110s. ** Please Note: This note has been constructed using a voice recognition system.  **

## 2023-08-22 NOTE — PLAN OF CARE
Problem: PAIN - ADULT  Goal: Verbalizes/displays adequate comfort level or baseline comfort level  Description: Interventions:  - Encourage patient to monitor pain and request assistance  - Assess pain using appropriate pain scale  - Administer analgesics based on type and severity of pain and evaluate response  - Implement non-pharmacological measures as appropriate and evaluate response  - Consider cultural and social influences on pain and pain management  - Notify physician/advanced practitioner if interventions unsuccessful or patient reports new pain  Outcome: Progressing     Problem: INFECTION - ADULT  Goal: Absence or prevention of progression during hospitalization  Description: INTERVENTIONS:  - Assess and monitor for signs and symptoms of infection  - Monitor lab/diagnostic results  - Monitor all insertion sites, i.e. indwelling lines, tubes, and drains  - Monitor endotracheal if appropriate and nasal secretions for changes in amount and color  - [place] appropriate cooling/warming therapies per order  - Administer medications as ordered  - Instruct and encourage patient and family to use good hand hygiene technique  - Identify and instruct in appropriate isolation precautions for identified infection/condition  Outcome: Progressing  Goal: Absence of fever/infection during neutropenic period  Description: INTERVENTIONS:  - Monitor WBC    Outcome: Progressing     Problem: SAFETY ADULT  Goal: Patient will remain free of falls  Description: INTERVENTIONS:  - Educate patient/family on patient safety including physical limitations  - Instruct patient to call for assistance with activity   - Consult OT/PT to assist with strengthening/mobility   - Keep Call bell within reach  - Keep bed low and locked with side rails adjusted as appropriate  - Keep care items and personal belongings within reach  - Initiate and maintain comfort rounds  - Make Fall Risk Sign visible to staff  - Offer Toileting every    Hours, in advance of need  - Initiate/Maintain   alarm  - Obtain necessary fall risk management equipment:     - Apply yellow socks and bracelet for high fall risk patients  - Consider moving patient to room near nurses station  Outcome: Progressing  Goal: Maintain or return to baseline ADL function  Description: INTERVENTIONS:  -  Assess patient's ability to carry out ADLs; assess patient's baseline for ADL function and identify physical deficits which impact ability to perform ADLs (bathing, care of mouth/teeth, toileting, grooming, dressing, etc.)  - Assess/evaluate cause of self-care deficits   - Assess range of motion  - Assess patient's mobility; develop plan if impaired  - Assess patient's need for assistive devices and provide as appropriate  - Encourage maximum independence but intervene and supervise when necessary  - Involve family in performance of ADLs  - Assess for home care needs following discharge   - Consider OT consult to assist with ADL evaluation and planning for discharge  - Provide patient education as appropriate  Outcome: Progressing  Goal: Maintains/Returns to pre admission functional level  Description: INTERVENTIONS:  - Perform BMAT or MOVE assessment daily.   - Set and communicate daily mobility goal to care team and patient/family/caregiver. - Collaborate with rehabilitation services on mobility goals if consulted  - Perform Range of Motion    times a day. - Reposition patient every    hours.   - Dangle patient    times a day  - Stand patient    times a day  - Ambulate patient    times a day  - Out of bed to chair    times a day   - Out of bed for meals            times a day  - Out of bed for toileting  - Record patient progress and toleration of activity level   Outcome: Progressing     Problem: DISCHARGE PLANNING  Goal: Discharge to home or other facility with appropriate resources  Description: INTERVENTIONS:  - Identify barriers to discharge w/patient and caregiver  - Arrange for needed discharge resources and transportation as appropriate  - Identify discharge learning needs (meds, wound care, etc.)  - Arrange for interpretive services to assist at discharge as needed  - Refer to Case Management Department for coordinating discharge planning if the patient needs post-hospital services based on physician/advanced practitioner order or complex needs related to functional status, cognitive ability, or social support system  Outcome: Progressing     Problem: Knowledge Deficit  Goal: Patient/family/caregiver demonstrates understanding of disease process, treatment plan, medications, and discharge instructions  Description: Complete learning assessment and assess knowledge base. Interventions:  - Provide teaching at level of understanding  - Provide teaching via preferred learning methods  Outcome: Progressing     Problem: Nutrition/Hydration-ADULT  Goal: Nutrient/Hydration intake appropriate for improving, restoring or maintaining nutritional needs  Description: Monitor and assess patient's nutrition/hydration status for malnutrition. Collaborate with interdisciplinary team and initiate plan and interventions as ordered. Monitor patient's weight and dietary intake as ordered or per policy. Utilize nutrition screening tool and intervene as necessary. Determine patient's food preferences and provide high-protein, high-caloric foods as appropriate.      INTERVENTIONS:  - Monitor oral intake, urinary output, labs, and treatment plans  - Assess nutrition and hydration status and recommend course of action  - Evaluate amount of meals eaten  - Assist patient with eating if necessary   - Allow adequate time for meals  - Recommend/ encourage appropriate diets, oral nutritional supplements, and vitamin/mineral supplements  - Order, calculate, and assess calorie counts as needed  - Recommend, monitor, and adjust tube feedings and TPN/PPN based on assessed needs  - Assess need for intravenous fluids  - Provide specific nutrition/hydration education as appropriate  - Include patient/family/caregiver in decisions related to nutrition  Outcome: Progressing

## 2023-08-22 NOTE — ASSESSMENT & PLAN NOTE
· Mildly elevated AST/ALT , T bili normal (does appear improved from prior labs in 2021)  · Denies abdominal pain. Did have prior episode of nausea and vomiting . · Possible from testosterone use?   · Repeat CMP in AM

## 2023-08-22 NOTE — PROGRESS NOTES
Pt reports good appetite at this time/baseline. Reports skipping breakfast each day. Lunch and dinner he eats though times vary greatly when he is home from college. Lunch and dinner typically comprise of hotdogs or red meat or chicken with french fries or roasted potatoes. Pt likes most all kinds of fruits, would eat an entire 1lb box of strawberries at a time if he desired. Drinks at least 32 oz of 1 or 2% milk daily along with diet soda or crystal light or powerade or propel water. Limited nonstarchy vegetable intake, likes cucumbers, peppers, mushrooms, carrots, onions only. Pt reports previously following with an RD through [facility] though didn't find the appointments helpful, wanted more literature and specific instructions. Pt also appears to have limited desire to change his behaviors, mother encouraging changes as her mother  of diabetic induced coma and her father is having kidney failure from uncontrolled DM. Provided diet instruction to pt with mother present. Discussed appropriate portion sizes of fruit and discussed balance snacks, to have with source of healthy fats and protein such as no sugar added peanut butter which pt enjoys, plans to have 1 small apple with 2 tablespoons peanut butter or 1 cup celery with 2 tablespoons peanut butter for snack option. Discussed decrease intake of milk to 8 oz per meal or 8 oz with snack. Encouraged water as primary beverage, to flavor with fresh lemon or True Lemon powder. Encouraged intake of nonstarchy vegetables though pt does appear resistant to trying this. Provided with Portions Booklet, Diabetes and You, Carb Counting for DM, Label Reading Tips, Plate Method handouts and RD contact information. Would recommend OP MNT referral as pt is agreeable, pt prefers idea of trying somewhere local and establishing with different RD. Continue CCD 2 as ordered.

## 2023-08-22 NOTE — ED PROVIDER NOTES
History  Chief Complaint   Patient presents with   • Fatigue     Patient reports that he has been feeling fatigued for the past few days and states he has been "heat intolerant", also states that he took his BP at home and the cuff said he was in a-fib and that his blood sugar was reading high on his grandfather's meter       History provided by:  Medical records, patient and parent (Mother)  Medical Problem  Location:  Weakness, fatigue  Severity:  Severe  Onset quality:  Gradual  Duration:  2 weeks  Timing:  Constant  Progression:  Worsening  Chronicity:  New  Context:  History of diabetes, known to [facility] endocrinology, currently only taking metformin, patient has noted polydipsia, polyuria, polyphagia, blurred vision over the past several weeks. Today at brother's soccer scrimmage patient had near syncopal event  Relieved by:  Nothing  Worsened by:  Nothing  Ineffective treatments:  None tried. Patient checked his blood sugar on his grandfathers machine and read high  Associated symptoms: fatigue    Associated symptoms: no abdominal pain, no chest pain, no cough, no ear pain, no fever, no headaches, no nausea, no rash, no rhinorrhea, no shortness of breath, no sore throat and no vomiting        Prior to Admission Medications   Prescriptions Last Dose Informant Patient Reported? Taking? Testosterone Cypionate 100 MG/ML SOLN 8/15/2023  Yes Yes   Si mg by Intramuscular (multi inj) route every 14 (fourteen) days   metFORMIN (GLUCOPHAGE) 500 mg tablet 2023  Yes Yes   Sig: Take 500 mg by mouth 2 (two) times a day with meals      Facility-Administered Medications: None       Past Medical History:   Diagnosis Date   • Diabetes mellitus ([address])        Past Surgical History:   Procedure Laterality Date   • BACK SURGERY         History reviewed. No pertinent family history. I have reviewed and agree with the history as documented.     E-Cigarette/Vaping     E-Cigarette/Vaping Substances     Social History Tobacco Use   • Smoking status: Never   • Smokeless tobacco: Never   Substance Use Topics   • Drug use: Never       Review of Systems   Constitutional: Positive for activity change, appetite change and fatigue. Negative for chills and fever. HENT: Negative for ear pain, rhinorrhea, sore throat and trouble swallowing. Eyes: Negative for pain, discharge and visual disturbance. Respiratory: Negative for cough, chest tightness and shortness of breath. Cardiovascular: Negative for chest pain and palpitations. Gastrointestinal: Negative for abdominal pain, nausea and vomiting. Endocrine: Positive for polydipsia, polyphagia and polyuria. Genitourinary: Negative for difficulty urinating, dysuria, hematuria and testicular pain. Musculoskeletal: Negative for arthralgias and back pain. Skin: Negative for color change and rash. Allergic/Immunologic: Negative for immunocompromised state. Neurological: Positive for syncope. Negative for dizziness, seizures, weakness and headaches. Hematological: Negative for adenopathy. Psychiatric/Behavioral: Negative for confusion and dysphoric mood. All other systems reviewed and are negative. Physical Exam  Physical Exam  Vitals and nursing note reviewed. Constitutional:       General: He is not in acute distress. Appearance: Normal appearance. He is obese. He is not ill-appearing, toxic-appearing or diaphoretic. HENT:      Head: Normocephalic and atraumatic. Nose: Nose normal. No congestion or rhinorrhea. Mouth/Throat:      Mouth: Mucous membranes are moist.      Pharynx: Oropharynx is clear. No oropharyngeal exudate or posterior oropharyngeal erythema. Eyes:      General:         Right eye: No discharge. Left eye: No discharge. Extraocular Movements: Extraocular movements intact. Conjunctiva/sclera: Conjunctivae normal.      Pupils: Pupils are equal, round, and reactive to light.       Comments: Disconjugate gaze, chronic   Cardiovascular:      Rate and Rhythm: Normal rate and regular rhythm. Pulses: Normal pulses. Heart sounds: Normal heart sounds. No murmur heard. No gallop. Pulmonary:      Effort: Pulmonary effort is normal. No respiratory distress. Breath sounds: Normal breath sounds. No stridor. No wheezing, rhonchi or rales. Chest:      Chest wall: No tenderness. Abdominal:      General: Bowel sounds are normal. There is no distension. Palpations: Abdomen is soft. There is no mass. Tenderness: There is no abdominal tenderness. There is no right CVA tenderness, left CVA tenderness, guarding or rebound. Hernia: No hernia is present. Musculoskeletal:         General: Normal range of motion. Cervical back: Normal range of motion and neck supple. Skin:     General: Skin is warm and dry. Capillary Refill: Capillary refill takes less than 2 seconds. Neurological:      General: No focal deficit present. Mental Status: He is alert and oriented to person, place, and time. Cranial Nerves: No cranial nerve deficit. Sensory: No sensory deficit. Motor: No weakness. Coordination: Coordination normal.      Gait: Gait normal.      Deep Tendon Reflexes: Reflexes normal.   Psychiatric:         Mood and Affect: Mood normal.         Behavior: Behavior normal.         Thought Content:  Thought content normal.         Judgment: Judgment normal.         Vital Signs  ED Triage Vitals   Temperature Pulse Respirations Blood Pressure SpO2   08/21/23 2128 08/21/23 2128 08/21/23 2128 08/21/23 2128 08/21/23 2128   98.9 °F (37.2 °C) (!) 114 18 132/90 100 %      Temp Source Heart Rate Source Patient Position - Orthostatic VS BP Location FiO2 (%)   08/21/23 2128 08/21/23 2128 08/21/23 2128 08/21/23 2128 --   Oral Monitor Sitting Left arm       Pain Score       08/22/23 0015       No Pain           Vitals:    08/21/23 2345 08/22/23 0000 08/22/23 0017 08/22/23 0018   BP: 137/88 116/78 135/86 135/86   Pulse: (!) 116 (!) 122  (!) 120   Patient Position - Orthostatic VS:    Lying         Visual Acuity      ED Medications  Medications   insulin regular (HumuLIN R,NovoLIN R) 1 Units/mL in sodium chloride 0.9 % 100 mL infusion (9 Units/hr Intravenous Rate/Dose Change 8/22/23 0102)   multi-electrolyte (PLASMALYTE-A/ISOLYTE-S PH 7.4) IV solution (150 mL/hr Intravenous New Bag 8/22/23 0107)   acetaminophen (TYLENOL) tablet 650 mg (has no administration in time range)   ondansetron (ZOFRAN) injection 4 mg (has no administration in time range)   lactated ringers bolus 1,000 mL (0 mL Intravenous Stopped 8/21/23 2311)   sodium chloride 0.9 % bolus 1,000 mL (0 mL Intravenous Stopped 8/22/23 0005)   insulin regular (HumuLIN R,NovoLIN R) injection 10 Units (10 Units Intravenous Given 8/21/23 2309)   potassium chloride (K-DUR,KLOR-CON) CR tablet 20 mEq (20 mEq Oral Given 8/22/23 0118)       Diagnostic Studies  Results Reviewed     Procedure Component Value Units Date/Time    Fingerstick Glucose (POCT) [[id number]]  (Abnormal) Collected: 08/21/23 2306    Lab Status: Final result Updated: 08/21/23 2311     POC Glucose >500 mg/dl     Urine Microscopic [[id number]]  (Normal) Collected: 08/21/23 2209    Lab Status: Final result Specimen: Urine, Clean Catch Updated: 08/21/23 2249     RBC, UA 0-1 /hpf      WBC, UA 0-1 /hpf      Epithelial Cells Occasional /hpf      Bacteria, UA Occasional /hpf     UA (URINE) with reflex to Scope [[id number]]  (Abnormal) Collected: 08/21/23 2209    Lab Status: Final result Specimen: Urine, Clean Catch Updated: 08/21/23 2228     Color, UA Yellow     Clarity, UA Clear     Specific Gravity, UA <=1.005     pH, UA 5.5     Leukocytes, UA Elevated glucose may cause decreased leukocyte values.  See urine microscopic for UWBC result     Nitrite, UA Negative     Protein, UA Negative mg/dl      Glucose, UA >=1000 (1%) mg/dl      Ketones, UA Negative mg/dl      Urobilinogen, UA 0.2 E.U./dl Bilirubin, UA Negative     Occult Blood, UA Trace-Intact    Comprehensive metabolic panel [[id number]]  (Abnormal) Collected: 08/21/23 2147    Lab Status: Final result Specimen: Blood from Arm, Right Updated: 08/21/23 2222     Sodium 135 mmol/L      Potassium 3.9 mmol/L      Chloride 96 mmol/L      CO2 26 mmol/L      ANION GAP 13 mmol/L      BUN 23 mg/dL      Creatinine 1.41 mg/dL      Glucose 900 mg/dL      Calcium 10.3 mg/dL      AST 61 U/L      ALT 85 U/L      Alkaline Phosphatase 87 U/L      Total Protein 8.1 g/dL      Albumin 4.5 g/dL      Total Bilirubin 0.59 mg/dL      eGFR 71 ml/min/1.73sq m     Narrative:      [place] guidelines for Chronic Kidney Disease (CKD):   •  Stage 1 with normal or high GFR (GFR > 90 mL/min/1.73 square meters)  •  Stage 2 Mild CKD (GFR = 60-89 mL/min/1.73 square meters)  •  Stage 3A Moderate CKD (GFR = 45-59 mL/min/1.73 square meters)  •  Stage 3B Moderate CKD (GFR = 30-44 mL/min/1.73 square meters)  •  Stage 4 Severe CKD (GFR = 15-29 mL/min/1.73 square meters)  •  Stage 5 End Stage CKD (GFR <15 mL/min/1.73 square meters)  Note: GFR calculation is accurate only with a steady state creatinine    Magnesium [[id number]]  (Normal) Collected: 08/21/23 2147    Lab Status: Final result Specimen: Blood from Arm, Right Updated: 08/21/23 2220     Magnesium 2.5 mg/dL     HS Troponin 0hr (reflex protocol) [[id number]]  (Normal) Collected: 08/21/23 2147    Lab Status: Final result Specimen: Blood from Arm, Right Updated: 08/21/23 2215     hs TnI 0hr 7 ng/L     Beta Hydroxybutyrate [[id number]]  (Normal) Collected: 08/21/23 2147    Lab Status: Final result Specimen: Blood from Arm, Right Updated: 08/21/23 2213     BETA-HYDROXYBUTYRATE 0.5 mmol/L     Blood gas, venous [[id number]]  (Abnormal) Collected: 08/21/23 2147    Lab Status: Final result Specimen: Blood from Arm, Right Updated: 08/21/23 2159     pH, Thomas 7.400     pCO2, Thomas 45.6 mm Hg      pO2, Thomas 48.0 mm Hg HCO3, Thomas 27.6 mmol/L      Base Excess, Thomas 2.1 mmol/L      O2 Content, Thomas 19.7 ml/dL      O2 HGB, VENOUS 82.3 %     CBC and differential [[id number]]  (Abnormal) Collected: 08/21/23 2147    Lab Status: Final result Specimen: Blood from Arm, Right Updated: 08/21/23 2153     WBC 9.99 Thousand/uL      RBC 5.09 Million/uL      Hemoglobin 16.2 g/dL      Hematocrit 47.2 %      MCV 93 fL      MCH 31.8 pg      MCHC 34.3 g/dL      RDW 12.2 %      MPV 11.4 fL      Platelets 378 Thousands/uL      nRBC 0 /100 WBCs      Neutrophils Relative 55 %      Immat GRANS % 0 %      Lymphocytes Relative 32 %      Monocytes Relative 10 %      Eosinophils Relative 2 %      Basophils Relative 1 %      Neutrophils Absolute 5.55 Thousands/µL      Immature Grans Absolute 0.04 Thousand/uL      Lymphocytes Absolute 3.16 Thousands/µL      Monocytes Absolute 0.97 Thousand/µL      Eosinophils Absolute 0.16 Thousand/µL      Basophils Absolute 0.11 Thousands/µL     Fingerstick Glucose (POCT) [[id number]]  (Abnormal) Collected: 08/21/23 2129    Lab Status: Final result Updated: 08/21/23 2130     POC Glucose >500 mg/dl                  No orders to display              Procedures  CriticalCare Time    Date/Time: 8/22/2023 1:46 AM    Performed by: Temi Lechuga MD  Authorized by: Temi Lechuga MD    Critical care provider statement:     Critical care time (minutes):  35    Critical care was necessary to treat or prevent imminent or life-threatening deterioration of the following conditions:  Endocrine crisis    Critical care was time spent personally by me on the following activities:  Interpretation of cardiac output measurements, ordering and performing treatments and interventions, ordering and review of laboratory studies, ordering and review of radiographic studies, re-evaluation of patient's condition, review of old charts, examination of patient, evaluation of patient's response to treatment, discussions with consultants, development of treatment plan with patient or surrogate and obtaining history from patient or surrogate    I assumed direction of critical care for this patient from another provider in my specialty: no               ED Course         DUNG    Flowsheet Row Most Recent Value   DUNG Initial Screen: During the past 12 months, did you:    1. Drink any alcohol (more than a few sips)? No Filed at: 08/21/2023 2217   2. Smoke any marijuana or hashish No Filed at: 08/21/2023 2217   3. Use anything else to get high? ("anything else" includes illegal drugs, over the counter and prescription drugs, and things that you sniff or 'garza')? No Filed at: 08/21/2023 2217                                          Medical Decision Making  2120: Patient appears well, vital signs reviewed. Patient has been noticing his of hyperglycemia. History of diabetes but only takes metformin, states he has never picked up a Accu-Chek machine. Patient appears mildly dehydrated on exam.  Accu-Chek reading high. Plan to complete basic labs including beta hydroxybutyrate, VBG, cardiac enzymes. Check EKG. I will rehydrate with IV fluids. Anticipate need for hyperglycemia control. 2229: Labs reviewed. Severe hyperglycemia. Continue IV hydration. I will start on IV insulin. I will consult medicine for admission. JOSH (acute kidney injury) ([address]): acute illness or injury  Hyperglycemia: acute illness or injury  Syncope: acute illness or injury  Amount and/or Complexity of Data Reviewed  Labs: ordered. ECG/medicine tests: ordered and independent interpretation performed. Details: Sinus tachycardia 122 bpm, no acute ischemia      Risk  OTC drugs. Decision regarding hospitalization.           Disposition  Final diagnoses:   Hyperglycemia   Syncope   JOSH (acute kidney injury) ([address])     Time reflects when diagnosis was documented in both MDM as applicable and the Disposition within this note     Time User Action Codes Description Comment    8/21/2023 10:30 PM Jazmin Ochoa [R73.9] Hyperglycemia     8/21/2023 10:30 PM Jazmin Ochoa [R55] Syncope     8/21/2023 10:30 PM Brandon Leal [N17.9] JOSH (acute kidney injury) ([address])     8/22/2023  1:09 AM Karen Luis [E11.00] Hyperosmolar hyperglycemic state ([facility]) [facility])       ED Disposition     ED Disposition   Admit    Condition   Stable    Date/Time   Mon Aug 21, 2023 10:29 PM    Comment              Follow-up Information    None         Current Discharge Medication List      CONTINUE these medications which have NOT CHANGED    Details   metFORMIN (GLUCOPHAGE) 500 mg tablet Take 500 mg by mouth 2 (two) times a day with meals      Testosterone Cypionate 100 MG/ML SOLN 100 mg by Intramuscular (multi inj) route every 14 (fourteen) days             No discharge procedures on file.     PDMP Review     None          ED Provider  Electronically Signed by           Sheryl Murrell MD  08/22/23 0559

## 2023-08-22 NOTE — PLAN OF CARE
Problem: PAIN - ADULT  Goal: Verbalizes/displays adequate comfort level or baseline comfort level  Description: Interventions:  - Encourage patient to monitor pain and request assistance  - Assess pain using appropriate pain scale  - Administer analgesics based on type and severity of pain and evaluate response  - Implement non-pharmacological measures as appropriate and evaluate response  - Consider cultural and social influences on pain and pain management  - Notify physician/advanced practitioner if interventions unsuccessful or patient reports new pain  Outcome: Progressing     Problem: INFECTION - ADULT  Goal: Absence or prevention of progression during hospitalization  Description: INTERVENTIONS:  - Assess and monitor for signs and symptoms of infection  - Monitor lab/diagnostic results  - Monitor all insertion sites, i.e. indwelling lines, tubes, and drains  - Monitor endotracheal if appropriate and nasal secretions for changes in amount and color  - [place] appropriate cooling/warming therapies per order  - Administer medications as ordered  - Instruct and encourage patient and family to use good hand hygiene technique  - Identify and instruct in appropriate isolation precautions for identified infection/condition  Outcome: Progressing  Goal: Absence of fever/infection during neutropenic period  Description: INTERVENTIONS:  - Monitor WBC    Outcome: Progressing     Problem: SAFETY ADULT  Goal: Patient will remain free of falls  Description: INTERVENTIONS:  - Educate patient/family on patient safety including physical limitations  - Instruct patient to call for assistance with activity   - Consult OT/PT to assist with strengthening/mobility   - Keep Call bell within reach  - Keep bed low and locked with side rails adjusted as appropriate  - Keep care items and personal belongings within reach  - Initiate and maintain comfort rounds  - Make Fall Risk Sign visible to staff  - Offer Toileting every  Hours, in advance of need  - Initiate/Maintain alarm  - Obtain necessary fall risk management equipment:   - Apply yellow socks and bracelet for high fall risk patients  - Consider moving patient to room near nurses station  Outcome: Progressing  Goal: Maintain or return to baseline ADL function  Description: INTERVENTIONS:  -  Assess patient's ability to carry out ADLs; assess patient's baseline for ADL function and identify physical deficits which impact ability to perform ADLs (bathing, care of mouth/teeth, toileting, grooming, dressing, etc.)  - Assess/evaluate cause of self-care deficits   - Assess range of motion  - Assess patient's mobility; develop plan if impaired  - Assess patient's need for assistive devices and provide as appropriate  - Encourage maximum independence but intervene and supervise when necessary  - Involve family in performance of ADLs  - Assess for home care needs following discharge   - Consider OT consult to assist with ADL evaluation and planning for discharge  - Provide patient education as appropriate  Outcome: Progressing  Goal: Maintains/Returns to pre admission functional level  Description: INTERVENTIONS:  - Perform BMAT or MOVE assessment daily.   - Set and communicate daily mobility goal to care team and patient/family/caregiver. - Collaborate with rehabilitation services on mobility goals if consulted  - Perform Range of Motion  times a day. - Reposition patient every hours.   - Dangle patient  times a day  - Stand patient  times a day  - Ambulate patient  times a day  - Out of bed to chair  times a day   - Out of bed for meals  times a day  - Out of bed for toileting  - Record patient progress and toleration of activity level   Outcome: Progressing     Problem: DISCHARGE PLANNING  Goal: Discharge to home or other facility with appropriate resources  Description: INTERVENTIONS:  - Identify barriers to discharge w/patient and caregiver  - Arrange for needed discharge resources and transportation as appropriate  - Identify discharge learning needs (meds, wound care, etc.)  - Arrange for interpretive services to assist at discharge as needed  - Refer to Case Management Department for coordinating discharge planning if the patient needs post-hospital services based on physician/advanced practitioner order or complex needs related to functional status, cognitive ability, or social support system  Outcome: Progressing     Problem: Knowledge Deficit  Goal: Patient/family/caregiver demonstrates understanding of disease process, treatment plan, medications, and discharge instructions  Description: Complete learning assessment and assess knowledge base.   Interventions:  - Provide teaching at level of understanding  - Provide teaching via preferred learning methods  Outcome: Progressing

## 2023-08-22 NOTE — UTILIZATION REVIEW
Initial Clinical Review    Admission: Date/Time/Statement:   Admission Orders (From admission, onward)     Ordered        08/21/23 2330  INPATIENT ADMISSION  Once                      Orders Placed This Encounter   Procedures   • INPATIENT ADMISSION     Standing Status:   Standing     Number of Occurrences:   1     Order Specific Question:   Level of Care     Answer:   Med Surg [16]     Order Specific Question:   Estimated length of stay     Answer:   More than 2 Midnights     Order Specific Question:   Certification     Answer:   I certify that inpatient services are medically necessary for this patient for a duration of greater than two midnights. See H&P and MD Progress Notes for additional information about the patient's course of treatment. ED Arrival Information     Expected   -    Arrival   8/21/2023 21:16    Acuity   Urgent            Means of arrival   Walk-In    Escorted by   Family Member    Service   Hospitalist    Admission type   Emergency            Arrival complaint   blurred vision/elevated sugar             Chief Complaint   Patient presents with   • Fatigue     Patient reports that he has been feeling fatigued for the past few days and states he has been "heat intolerant", also states that he took his BP at home and the cuff said he was in a-fib and that his blood sugar was reading high on his grandfather's meter       Initial Presentation: 23 y.o. male to ED via walk-in from home  Present to ED with near syncopal episode at his brother's baseball game. Patient reports he developed blurry vision and family reports he "fell asleep" , patient denies any loss of consciousness or fall. Endorses feeling fatigued and generally unwell x 1week and had episodes of nausea and vomiting.    PMHX 2 diabetes, obesity, growth hormone deficiency, schwannoma, morning glory optic disc anomaly  Admitted to [facility] with DX: Hyperosmolar hyperglycemic state   on exam: Tachy; tachypnea; hypertensive; Cr 1.41 (baseline 0.7); Gluc 900; Mildly elevated AST/ALT   PLAN: cont insulin gtt; cont ivf; monitor labs; Accuchecks with ssic; consult endocrine      Date: 8/22/23   Day 2  Patient is feeling better when he got up today to walk to the bathroom no dizziness he ate his meal; camryn resolved; Transaminitis improved  Plan: cont ivf but Switch to half-normal saline at 125 mill per hour;  We will give Lantus 13 units now and discontinue insulin drip in 1 hour placed on Humalog 9 units with meals sliding scale and give another dose of Lantus 13 units at night; Endocrine consult    ED Triage Vitals   Temperature Pulse Respirations Blood Pressure SpO2   08/21/23 2128 08/21/23 2128 08/21/23 2128 08/21/23 2128 08/21/23 2128   98.9 °F (37.2 °C) (!) 114 18 132/90 100 %      Temp Source Heart Rate Source Patient Position - Orthostatic VS BP Location FiO2 (%)   08/21/23 2128 08/21/23 2128 08/21/23 2128 08/21/23 2128 --   Oral Monitor Sitting Left arm       Pain Score       08/22/23 0015       No Pain          Wt Readings from Last 1 Encounters:   08/22/23 109 kg (239 lb 13.8 oz)     Additional Vital Signs:   Date/Time Temp Pulse Resp BP MAP (mmHg) SpO2 O2 Device Patient Position - Orthostatic VS   08/22/23 15:07:32 98.2 °F (36.8 °C) -- 18 134/89 104 -- -- --   08/22/23 08:20:10 97 °F (36.1 °C) Abnormal  106 Abnormal  20 136/83 101 96 % None (Room air) --   08/22/23 00:18:54 98.8 °F (37.1 °C) 120 Abnormal  18 135/86 102 92 % None (Room air) Lying   08/22/23 00:17:55 -- -- -- 135/86 102 -- -- --   08/22/23 0000 -- 122 Abnormal  21 116/78 93 94 % -- --   08/21/23 2345 -- 116 Abnormal  21 137/88 104 94 % -- --   08/21/23 2315 -- 116 Abnormal  16 136/87 107 95 % None (Room air) Lying   08/21/23 2230 -- 113 Abnormal  17 120/80 96 92 % -- --   08/21/23 2128 98.9 °F (37.2 °C) 114 Abnormal  18 132/90 -- 100 % None (Room air) Sitting           EKG: Sinus tachycardia  Otherwise normal ECG  No previous ECGs available      Results from last 7 days   Lab Units 08/22/23  0519 08/21/23  2147   WBC Thousand/uL 9.11 9.99   HEMOGLOBIN g/dL 14.4 16.2   HEMATOCRIT % 42.9 47.2   PLATELETS Thousands/uL 181 231   NEUTROS ABS Thousands/µL 4.38 5.55         Results from last 7 days   Lab Units 08/22/23  1044 08/22/23  0519 08/21/23  2147   SODIUM mmol/L 146 151* 135   POTASSIUM mmol/L 3.5 3.4* 3.9   CHLORIDE mmol/L 110* 112* 96   CO2 mmol/L 29 31 26   ANION GAP mmol/L 7 8 13   BUN mg/dL 12 14 23   CREATININE mg/dL 0.92 0.89 1.41*   EGFR ml/min/1.73sq m 119 123 71   CALCIUM mg/dL 8.9 9.1 10.3*   MAGNESIUM mg/dL  --   --  2.5     Results from last 7 days   Lab Units 08/22/23  0519 08/21/23 2147   AST U/L 49* 61*   ALT U/L 65* 85*   ALK PHOS U/L 74 87   TOTAL PROTEIN g/dL 6.6 8.1   ALBUMIN g/dL 3.8 4.5   TOTAL BILIRUBIN mg/dL 0.49 0.59     Results from last 7 days   Lab Units 08/22/23  1121 08/22/23  0854 08/22/23  0709 08/22/23  0517 08/22/23  0321 08/22/23  0101 08/22/23  0024 08/21/23  2306 08/21/23  2129   POC GLUCOSE mg/dl 332* 361* 262* 263* 253* 399* 450* >500* >500*  >500*     Results from last 7 days   Lab Units 08/22/23  1044 08/22/23  0519 08/21/23  2147   GLUCOSE RANDOM mg/dL 335* 267* 900*       BETA-HYDROXYBUTYRATE   Date Value Ref Range Status   08/21/2023 0.5 <0.6 mmol/L Final          Results from last 7 days   Lab Units 08/21/23 2147   PH MONIQUE  7.400   PCO2 MONIQUE mm Hg 45.6   PO2 MONIQUE mm Hg 48.0*   HCO3 MONIQUE mmol/L 27.6   BASE EXC MONIQUE mmol/L 2.1   O2 CONTENT MONIQUE ml/dL 19.7   O2 HGB, VENOUS % 82.3*       Results from last 7 days   Lab Units 08/21/23  2147   HS TNI 0HR ng/L 7       Results from last 7 days   Lab Units 08/22/23  0519   TSH 3RD GENERATON uIU/mL 3.115       Results from last 7 days   Lab Units 08/21/23  2209   CLARITY UA  Clear   COLOR UA  Yellow   SPEC GRAV UA  <=1.005   PH UA  5.5   GLUCOSE UA mg/dl >=1000 (1%)*   KETONES UA mg/dl Negative   BLOOD UA  Trace-Intact*   PROTEIN UA mg/dl Negative   NITRITE UA  Negative   BILIRUBIN UA  Negative   UROBILINOGEN UA E.U./dl 0.2   LEUKOCYTES UA  Elevated glucose may cause decreased leukocyte values. See urine microscopic for UWBC result*   WBC UA /hpf 0-1   RBC UA /hpf 0-1   BACTERIA UA /hpf Occasional   EPITHELIAL CELLS WET PREP /hpf Occasional       ED Treatment:   Medication Administration from 08/21/2023 2112 to 08/22/2023 0013       Date/Time Order Dose Route Action     08/21/2023 2151 EDT lactated ringers bolus 1,000 mL 1,000 mL Intravenous New Bag     08/21/2023 2307 EDT insulin regular (HumuLIN R,NovoLIN R) 1 Units/mL in sodium chloride 0.9 % 100 mL infusion 10 Units/hr Intravenous New Bag     08/21/2023 2304 EDT sodium chloride 0.9 % bolus 1,000 mL 1,000 mL Intravenous New Bag     08/21/2023 2309 EDT insulin regular (HumuLIN R,NovoLIN R) injection 10 Units 10 Units Intravenous Given        Admitting Diagnosis: Syncope [R55]  Fatigue [R53.83]  Hyperglycemia [R73.9]  JOSH (acute kidney injury) ([address]) [N17.9]     Age/Sex: 23 y.o. male     Admission Orders: SCDs; daily wts; I/O; Consistent Carbohydrate Diet. Blood glucose checks ACHS. Scheduled Medications:  insulin glargine, 30 Units, Subcutaneous, HS  insulin lispro, 1-6 Units, Subcutaneous, HS  insulin lispro, 10 Units, Subcutaneous, TID With Meals  insulin lispro, 2-12 Units, Subcutaneous, TID AC      Continuous IV Infusions:  sodium chloride, 125 mL/hr, Intravenous, Continuous  insulin regular (HumuLIN R,NovoLIN R) 1 Units/mL in sodium chloride 0.9 % 100 mL infusion Rate: 0.3-21 mL/hr   multi-electrolyte (PLASMALYTE-A/ISOLYTE-S PH 7.4) IV solution Rate: 150 mL/hr    sodium chloride infusion 0.45 % Rate: 125 mL/hr      PRN Meds:  acetaminophen, 650 mg, Oral, Q6H PRN  ondansetron, 4 mg, Intravenous, Q6H PRN        IP CONSULT TO ENDOCRINOLOGY  IP CONSULT TO CASE MANAGEMENT    Network Utilization Review Department  ATTENTION: Please call with any questions or concerns to [phone] and carefully listen to the prompts so that you are directed to the right person.  All voicemails are confidential.  Franco Arabia all requests for admission clinical reviews, approved or denied determinations and any other requests to dedicated fax number below belonging to the campus where the patient is receiving treatment.  List of dedicated fax numbers for the Facilities:  [place] [facility] (Administrative/Medical Necessity) [phone] [address] [facility] (Maternity/NICU/Pediatrics) [phone]   [address] [place] [address] Drive [phone]   [facility] [phone]   [facility] [address] [place] [address] [phone]   [address] [address] [phone]

## 2023-08-22 NOTE — CONSULTS
TeleConsultation - Endocrine  Ramu Petersen 23 y.o. male MRN: [id number]  Unit/Bed#: -01 Encounter: [id number]        REQUIRED DOCUMENTATION:     1. This service was provided via Telemedicine. 2. Provider located at [address].  3. TeleMed provider: Lore Hightower DO.  4. Identify all parties in room with patient during tele consult:myself, patient and his mother  11. Patient was then informed that this was a Telemedicine visit and that the exam was being conducted confidentially over secure lines. My office door was closed. No one else was in the room. Patient acknowledged consent and understanding of privacy and security of the Telemedicine visit, and gave us permission to have the assistant stay in the room in order to assist with the history and to conduct the exam.  I informed the patient that I have reviewed their record in Epic and presented the opportunity for them to ask any questions regarding the visit today. The patient agreed to participate. Assessment/Plan     Assessment:    1. Diabetes mellitus     Plan:    Uncontrolled. Recommend increase in insulin. Increase lantus 36 units qHS, increase humalog 12 units plus scale #4/3 AC/HS. Continue carb consistent diet. Recommend insulin therapy on discharge. Please provide inpatient instruction for insulin administration and for fingerstick testing. Patient will need outpatient endocrinology referral. He will also benefit from outpatient MNT/DSME counseling. I would recommend outpatient testing for beta-cell function and autoantibody status as well given young age. May follow up with primary endocrinologist or referral may be placed to my clinic for closer proximity. I will continue to follow and update recommendations as appropriate.     Upon Discharge:  Patient will need insulin PENS, not VIALS  Patient will need pen needles  Patient will need glucometer  Patient will need test strips and lancets  Patient will need alcohol pads Upon discharge, if Lantus is not the preferred basal insulin for the patient's insurance company, we can use Tresiba, Basaglar, Levemir, Toujeo at the same dose instead. Upon discharge, if Humalog is not the preferred mealtime insulin for the patient's insurance, we can use NovoLog, Fiasp, Admelog, or Apidra at the same dose instead. History of Present Illness     HPI: Trihsa Reynolds is a 23y.o. year old male who presents with severe, symptomatic hyperglycemia. Last a1c elevated in July >8%. Patient had been on metformin 500 mg bid dosing at home. He does not have glucometer, and was not performing ambulatory BG monitoring. Symptoms included weight loss, polydipsia, and polyuria plus fatigue. Plasma glucose 900 on presentation, also was creatinine 1.41, mildly elevated calcium and transaminases. No evidence of acidosis on labs. Patient briefly on insulin gtt, transitioned to [facility] insulin with persistent hyperglycemia. Notes diet includes many whole fruits, but no consumption of sugary beverages. Patient feeling better now, and has intact appetite. He will be returning to college next week. His mother is also on the call and is providing most elements of the history. She has concerns about him returning to school. Inpatient consult to Endocrinology  Consult performed by: Marek Martinez DO  Consult ordered by: STEPHENIE Mandel          Review of Systems   Constitutional: Negative for diaphoresis and unexpected weight change. Gastrointestinal: Negative for nausea and vomiting. Endocrine: Negative for polydipsia and polyuria. All other systems reviewed and are negative.       Historical Information   Past Medical History:   Diagnosis Date   • Diabetes mellitus ([address])      Past Surgical History:   Procedure Laterality Date   • BACK SURGERY       Social History   Social History     Substance and Sexual Activity   Alcohol Use None     Social History     Substance and Sexual Activity Drug Use Never     E-Cigarette/Vaping     E-Cigarette/Vaping Substances     Social History     Tobacco Use   Smoking Status Never   Smokeless Tobacco Never     Family History: History reviewed. No pertinent family history. Meds/Allergies   current meds:   Current Facility-Administered Medications   Medication Dose Route Frequency   • acetaminophen (TYLENOL) tablet 650 mg  650 mg Oral Q6H PRN   • insulin glargine (LANTUS) subcutaneous injection 36 Units 0.36 mL  36 Units Subcutaneous HS   • insulin lispro (HumaLOG) 100 units/mL subcutaneous injection 1-6 Units  1-6 Units Subcutaneous HS   • [START ON 8/23/2023] insulin lispro (HumaLOG) 100 units/mL subcutaneous injection 12 Units  12 Units Subcutaneous TID With Meals   • insulin lispro (HumaLOG) 100 units/mL subcutaneous injection 2-12 Units  2-12 Units Subcutaneous TID AC   • ondansetron (ZOFRAN) injection 4 mg  4 mg Intravenous Q6H PRN   • sodium chloride infusion 0.45 %  125 mL/hr Intravenous Continuous     No Known Allergies    Objective   Vitals: Blood pressure 136/83, pulse (!) 106, temperature (!) 97 °F (36.1 °C), resp. rate 20, height 5' 11" (1.803 m), weight 109 kg (239 lb 13.8 oz), SpO2 96 %. Intake/Output Summary (Last 24 hours) at 8/22/2023 1217  Last data filed at 8/22/2023 0214  Gross per 24 hour   Intake 2780 ml   Output 2225 ml   Net 555 ml     Invasive Devices     Peripheral Intravenous Line  Duration           Peripheral IV 08/21/23 Dorsal (posterior); Left Hand <1 day    Peripheral IV 08/21/23 Right Antecubital <1 day                Physical Exam  Vitals reviewed. Constitutional:       General: He is not in acute distress. Appearance: Normal appearance. HENT:      Head: Normocephalic and atraumatic. Nose: Nose normal.   Eyes:      General: No scleral icterus. Conjunctiva/sclera: Conjunctivae normal.   Pulmonary:      Effort: Pulmonary effort is normal. No respiratory distress.    Musculoskeletal:      Cervical back: Normal range of motion. Neurological:      Mental Status: He is alert. Lab Results: I have personally reviewed pertinent reports. Lab Results   Component Value Date    SODIUM 146 08/22/2023    K 3.5 08/22/2023     (H) 08/22/2023    CO2 29 08/22/2023    BUN 12 08/22/2023    CREATININE 0.92 08/22/2023    GLUC 335 (H) 08/22/2023    CALCIUM 8.9 08/22/2023     Lab Results   Component Value Date    HGBA1C 8.8 (H) 07/03/2023         Imaging Studies: I have personally reviewed pertinent reports. Counseling / Coordination of Care  I have spent a total time of 40 minutes on 08/22/23 in caring for this patient including Diagnostic results, Risks and benefits of tx options, Instructions for management, Patient and family education, Risk factor reductions, Impressions, Counseling / Coordination of care, Documenting in the medical record, Reviewing / ordering tests, medicine, procedures   and Obtaining or reviewing history  .

## 2023-08-23 PROBLEM — E87.0 HYPERNATREMIA: Status: ACTIVE | Noted: 2023-08-23

## 2023-08-23 PROBLEM — E11.65 TYPE 2 DIABETES MELLITUS WITH HYPERGLYCEMIA, WITHOUT LONG-TERM CURRENT USE OF INSULIN (HCC): Status: ACTIVE | Noted: 2023-08-23

## 2023-08-23 LAB
ANION GAP SERPL CALCULATED.3IONS-SCNC: 6 MMOL/L
ANION GAP SERPL CALCULATED.3IONS-SCNC: 7 MMOL/L
ANION GAP SERPL CALCULATED.3IONS-SCNC: 8 MMOL/L
BUN SERPL-MCNC: 7 MG/DL (ref 5–25)
BUN SERPL-MCNC: 9 MG/DL (ref 5–25)
BUN SERPL-MCNC: 9 MG/DL (ref 5–25)
CALCIUM SERPL-MCNC: 8.6 MG/DL (ref 8.4–10.2)
CALCIUM SERPL-MCNC: 8.8 MG/DL (ref 8.4–10.2)
CALCIUM SERPL-MCNC: 9.1 MG/DL (ref 8.4–10.2)
CHLORIDE SERPL-SCNC: 111 MMOL/L (ref 96–108)
CHLORIDE SERPL-SCNC: 114 MMOL/L (ref 96–108)
CHLORIDE SERPL-SCNC: 117 MMOL/L (ref 96–108)
CO2 SERPL-SCNC: 26 MMOL/L (ref 21–32)
CO2 SERPL-SCNC: 26 MMOL/L (ref 21–32)
CO2 SERPL-SCNC: 29 MMOL/L (ref 21–32)
CREAT SERPL-MCNC: 0.8 MG/DL (ref 0.6–1.3)
CREAT SERPL-MCNC: 0.82 MG/DL (ref 0.6–1.3)
CREAT SERPL-MCNC: 0.88 MG/DL (ref 0.6–1.3)
GFR SERPL CREATININE-BSD FRML MDRD: 123 ML/MIN/1.73SQ M
GFR SERPL CREATININE-BSD FRML MDRD: 127 ML/MIN/1.73SQ M
GFR SERPL CREATININE-BSD FRML MDRD: 128 ML/MIN/1.73SQ M
GLUCOSE SERPL-MCNC: 212 MG/DL (ref 65–140)
GLUCOSE SERPL-MCNC: 253 MG/DL (ref 65–140)
GLUCOSE SERPL-MCNC: 262 MG/DL (ref 65–140)
GLUCOSE SERPL-MCNC: 268 MG/DL (ref 65–140)
GLUCOSE SERPL-MCNC: 298 MG/DL (ref 65–140)
GLUCOSE SERPL-MCNC: 358 MG/DL (ref 65–140)
GLUCOSE SERPL-MCNC: 361 MG/DL (ref 65–140)
OSMOLALITY UR: 209 MMOL/KG
POTASSIUM SERPL-SCNC: 3.3 MMOL/L (ref 3.5–5.3)
POTASSIUM SERPL-SCNC: 3.6 MMOL/L (ref 3.5–5.3)
POTASSIUM SERPL-SCNC: 3.7 MMOL/L (ref 3.5–5.3)
SODIUM SERPL-SCNC: 146 MMOL/L (ref 135–147)
SODIUM SERPL-SCNC: 148 MMOL/L (ref 135–147)
SODIUM SERPL-SCNC: 150 MMOL/L (ref 135–147)

## 2023-08-23 PROCEDURE — 99232 SBSQ HOSP IP/OBS MODERATE 35: CPT | Performed by: FAMILY MEDICINE

## 2023-08-23 PROCEDURE — 83935 ASSAY OF URINE OSMOLALITY: CPT | Performed by: FAMILY MEDICINE

## 2023-08-23 PROCEDURE — 82948 REAGENT STRIP/BLOOD GLUCOSE: CPT

## 2023-08-23 PROCEDURE — 80048 BASIC METABOLIC PNL TOTAL CA: CPT | Performed by: FAMILY MEDICINE

## 2023-08-23 RX ORDER — INSULIN LISPRO 100 [IU]/ML
4-20 INJECTION, SOLUTION INTRAVENOUS; SUBCUTANEOUS
Status: DISCONTINUED | OUTPATIENT
Start: 2023-08-23 | End: 2023-08-25 | Stop reason: HOSPADM

## 2023-08-23 RX ORDER — DESMOPRESSIN ACETATE 0.1 MG/1
0.1 TABLET ORAL ONCE
Status: COMPLETED | OUTPATIENT
Start: 2023-08-24 | End: 2023-08-23

## 2023-08-23 RX ORDER — INSULIN GLARGINE 100 [IU]/ML
50 INJECTION, SOLUTION SUBCUTANEOUS
Status: DISCONTINUED | OUTPATIENT
Start: 2023-08-23 | End: 2023-08-25

## 2023-08-23 RX ORDER — POTASSIUM CHLORIDE 20 MEQ/1
40 TABLET, EXTENDED RELEASE ORAL ONCE
Status: COMPLETED | OUTPATIENT
Start: 2023-08-24 | End: 2023-08-23

## 2023-08-23 RX ORDER — INSULIN LISPRO 100 [IU]/ML
20 INJECTION, SOLUTION INTRAVENOUS; SUBCUTANEOUS
Status: DISCONTINUED | OUTPATIENT
Start: 2023-08-23 | End: 2023-08-25 | Stop reason: HOSPADM

## 2023-08-23 RX ORDER — SODIUM CHLORIDE 9 MG/ML
1000 INJECTION, SOLUTION INTRAVENOUS ONCE
Status: COMPLETED | OUTPATIENT
Start: 2023-08-23 | End: 2023-08-23

## 2023-08-23 RX ORDER — INSULIN LISPRO 100 [IU]/ML
2-12 INJECTION, SOLUTION INTRAVENOUS; SUBCUTANEOUS
Status: DISCONTINUED | OUTPATIENT
Start: 2023-08-23 | End: 2023-08-25 | Stop reason: HOSPADM

## 2023-08-23 RX ORDER — INSULIN LISPRO 100 [IU]/ML
18 INJECTION, SOLUTION INTRAVENOUS; SUBCUTANEOUS
Status: DISCONTINUED | OUTPATIENT
Start: 2023-08-23 | End: 2023-08-23

## 2023-08-23 RX ADMIN — SODIUM CHLORIDE 150 ML/HR: 0.45 INJECTION, SOLUTION INTRAVENOUS at 14:55

## 2023-08-23 RX ADMIN — INSULIN LISPRO 4 UNITS: 100 INJECTION, SOLUTION INTRAVENOUS; SUBCUTANEOUS at 22:08

## 2023-08-23 RX ADMIN — INSULIN GLARGINE 50 UNITS: 100 INJECTION, SOLUTION SUBCUTANEOUS at 22:08

## 2023-08-23 RX ADMIN — INSULIN LISPRO 15 UNITS: 100 INJECTION, SOLUTION INTRAVENOUS; SUBCUTANEOUS at 08:25

## 2023-08-23 RX ADMIN — INSULIN LISPRO 12 UNITS: 100 INJECTION, SOLUTION INTRAVENOUS; SUBCUTANEOUS at 17:32

## 2023-08-23 RX ADMIN — SODIUM CHLORIDE 1000 ML/HR: 0.9 INJECTION, SOLUTION INTRAVENOUS at 10:15

## 2023-08-23 RX ADMIN — INSULIN LISPRO 10 UNITS: 100 INJECTION, SOLUTION INTRAVENOUS; SUBCUTANEOUS at 08:25

## 2023-08-23 RX ADMIN — DESMOPRESSIN ACETATE 0.1 MG: 0.1 TABLET ORAL at 23:57

## 2023-08-23 RX ADMIN — SODIUM CHLORIDE 150 ML/HR: 0.45 INJECTION, SOLUTION INTRAVENOUS at 22:08

## 2023-08-23 RX ADMIN — INSULIN LISPRO 6 UNITS: 100 INJECTION, SOLUTION INTRAVENOUS; SUBCUTANEOUS at 12:35

## 2023-08-23 RX ADMIN — POTASSIUM CHLORIDE 40 MEQ: 1500 TABLET, EXTENDED RELEASE ORAL at 23:57

## 2023-08-23 RX ADMIN — SODIUM CHLORIDE 125 ML/HR: 0.45 INJECTION, SOLUTION INTRAVENOUS at 02:18

## 2023-08-23 RX ADMIN — INSULIN LISPRO 20 UNITS: 100 INJECTION, SOLUTION INTRAVENOUS; SUBCUTANEOUS at 17:33

## 2023-08-23 NOTE — ASSESSMENT & PLAN NOTE
· Initially improved to 151 now with corrected glucose is 152 will give another lite of nss and increase 1/2 nss to 150 ml /hr  · Repeat bmp in 1300

## 2023-08-23 NOTE — QUICK NOTE
Patient has been drinking and has been on hypertonic saline for over 24 hours the sodium was not really coming down it only came down to 153 I did talk to the patient as well as nephrology patient is urinating decent amount he is drinking water and his urine sounds more like dilute concern is for diabetes insipidus patient will get a urine osmolality then we will get a dose after it is collected of DDAVP as per nephrology of 0.1 mg and recheck a urinalysis 2 hours later.    Nephrology consult  Bmp in 6 hours  If urine output does drop afterwards then fluids will be discontinued and allowing him to correct his water deficit by thirst  Updated mother

## 2023-08-23 NOTE — PROGRESS NOTES
Diabetic education provided to patient. Review of medications, S/S hyper and hypoglycemia initialed by primary RN's. Pt to begin demonstration of self administration of insulin with dinner. Per pt, he has given SQ injections to himself in the past and feels comfortable administering Insulin per orders. Support to continue and staff will monitor for learning.

## 2023-08-23 NOTE — ASSESSMENT & PLAN NOTE
· -120 . EKG shows sinus tachycardia improved was in the 80s but now on monitor 106 but with HHS due to patient and up to 10 L of fluid we will give him a liter bolus and increase his half-normal saline to 150 mL  · Patient denies chest pain , palpitation, sob.    · Afebrile  · tsh nml

## 2023-08-23 NOTE — PLAN OF CARE
Problem: PAIN - ADULT  Goal: Verbalizes/displays adequate comfort level or baseline comfort level  Description: Interventions:  - Encourage patient to monitor pain and request assistance  - Assess pain using appropriate pain scale  - Administer analgesics based on type and severity of pain and evaluate response  - Implement non-pharmacological measures as appropriate and evaluate response  - Consider cultural and social influences on pain and pain management  - Notify physician/advanced practitioner if interventions unsuccessful or patient reports new pain  Outcome: Progressing     Problem: INFECTION - ADULT  Goal: Absence or prevention of progression during hospitalization  Description: INTERVENTIONS:  - Assess and monitor for signs and symptoms of infection  - Monitor lab/diagnostic results  - Monitor all insertion sites, i.e. indwelling lines, tubes, and drains  - Monitor endotracheal if appropriate and nasal secretions for changes in amount and color  - Emmet appropriate cooling/warming therapies per order  - Administer medications as ordered  - Instruct and encourage patient and family to use good hand hygiene technique  - Identify and instruct in appropriate isolation precautions for identified infection/condition  Outcome: Progressing     Problem: SAFETY ADULT  Goal: Patient will remain free of falls  Description: INTERVENTIONS:  - Educate patient/family on patient safety including physical limitations  - Instruct patient to call for assistance with activity   - Consult OT/PT to assist with strengthening/mobility   - Keep Call bell within reach  - Keep bed low and locked with side rails adjusted as appropriate  - Keep care items and personal belongings within reach  - Initiate and maintain comfort rounds  - Make Fall Risk Sign visible to staff  - Offer Toileting every  Hours, in advance of need  - Initiate/Maintain alarm  - Obtain necessary fall risk management equipment:  - Apply yellow socks and bracelet for high fall risk patients  - Consider moving patient to room near nurses station  Outcome: Progressing     Problem: Nutrition/Hydration-ADULT  Goal: Nutrient/Hydration intake appropriate for improving, restoring or maintaining nutritional needs  Description: Monitor and assess patient's nutrition/hydration status for malnutrition. Collaborate with interdisciplinary team and initiate plan and interventions as ordered. Monitor patient's weight and dietary intake as ordered or per policy. Utilize nutrition screening tool and intervene as necessary. Determine patient's food preferences and provide high-protein, high-caloric foods as appropriate.      INTERVENTIONS:  - Monitor oral intake, urinary output, labs, and treatment plans  - Assess nutrition and hydration status and recommend course of action  - Evaluate amount of meals eaten  - Assist patient with eating if necessary   - Allow adequate time for meals  - Recommend/ encourage appropriate diets, oral nutritional supplements, and vitamin/mineral supplements  - Order, calculate, and assess calorie counts as needed  - Recommend, monitor, and adjust tube feedings and TPN/PPN based on assessed needs  - Assess need for intravenous fluids  - Provide specific nutrition/hydration education as appropriate  - Include patient/family/caregiver in decisions related to nutrition  Outcome: Progressing

## 2023-08-23 NOTE — ASSESSMENT & PLAN NOTE
· Patient reports near syncopal event while outside watching a baseball game.    · In light of HHS dehydration no recurrence no dizziness when ambulating

## 2023-08-23 NOTE — ASSESSMENT & PLAN NOTE
· Mildly elevated AST/ALT , T bili normal (does appear improved from prior labs in 2021)  · Denies abdominal pain. Did have prior episode of nausea and vomiting . · Possible from testosterone use?   · Improved will trend more meghan am

## 2023-08-23 NOTE — NURSING NOTE
This RN provided patient with diabetic booklet earlier today and instructed to read through and have any questions ready when I return later to do demonstration of self administering insulin. Diabetic education provided to patient via demonstration and explanation to include administering self injection with dinner today by this RN. Patient is very comfortable with the process of preparing a click pen, prepping the site, and performing the injection and exhibited understanding by demonstrating to this RN. Patient self administered supper time insulin with supervision by this RN. All questions and concerns addressed.

## 2023-08-23 NOTE — ASSESSMENT & PLAN NOTE
Lab Results   Component Value Date    HGBA1C 8.8 (H) 07/03/2023       Recent Labs     08/22/23  1121 08/22/23  1601 08/22/23  2114 08/23/23  0813   POCGLU 332* 291* 341* 358*       Blood Sugar Average: Last 72 hrs:  (P) 331   · Uncontrolled evaluated by endocrinology yesterday today that endocrinology has increased his Lantus to 45 units at night and Humalog 15 units with meal he will be discharged with insulin we will have to provide education to the patient on the use.

## 2023-08-23 NOTE — ASSESSMENT & PLAN NOTE
· Follows with [facility] endocrinology   · Takes testosterone injections 2x monthly - hold while inpatient

## 2023-08-23 NOTE — PLAN OF CARE
Problem: Nutrition/Hydration-ADULT  Goal: Nutrient/Hydration intake appropriate for improving, restoring or maintaining nutritional needs  Description: Monitor and assess patient's nutrition/hydration status for malnutrition. Collaborate with interdisciplinary team and initiate plan and interventions as ordered. Monitor patient's weight and dietary intake as ordered or per policy. Utilize nutrition screening tool and intervene as necessary. Determine patient's food preferences and provide high-protein, high-caloric foods as appropriate. INTERVENTIONS:  - Monitor oral intake, urinary output, labs, and treatment plans  - Assess nutrition and hydration status and recommend course of action  - Evaluate amount of meals eaten  - Assist patient with eating if necessary   - Allow adequate time for meals  - Recommend/ encourage appropriate diets, oral nutritional supplements, and vitamin/mineral supplements  - Order, calculate, and assess calorie counts as needed  - Recommend, monitor, and adjust tube feedings and TPN/PPN based on assessed needs  - Assess need for intravenous fluids  - Provide specific nutrition/hydration education as appropriate  - Include patient/family/caregiver in decisions related to nutrition  Outcome: Progressing     Problem: Knowledge Deficit  Goal: Patient/family/caregiver demonstrates understanding of disease process, treatment plan, medications, and discharge instructions  Description: Complete learning assessment and assess knowledge base.   Interventions:  - Provide teaching at level of understanding  - Provide teaching via preferred learning methods  Outcome: Progressing

## 2023-08-23 NOTE — PROGRESS NOTES
[address] [facility]  Progress Note  Name: Shine Finney  MRN: [id number]  Unit/Bed#: -01 I Date of Admission: 8/21/2023   Date of Service: 8/23/2023 I Hospital Day: 2    Assessment/Plan   * Hyperosmolar hyperglycemic state (HHS) [facility])  Assessment & Plan  Lab Results   Component Value Date    HGBA1C 8.8 (H) 07/03/2023       Recent Labs     08/22/23  1121 08/22/23  1601 08/22/23  2114 08/23/23  0813   POCGLU 332* 291* 341* 358*       Blood Sugar Average: Last 72 hrs:  (P) 331     · Presents after near syncopal episode while watching brothers soccer game. Reports fatigue and feeling generally unwell x 1 week. In ED, found to have blood sugar of 900. Hx DM2 on metformin for 2 years, does not check blood sugars at home. · Not in DKA- anion gap normal. VBG normal pH. · Resolved  · On subcutaneous insulin    Type 2 diabetes mellitus with hyperglycemia, without long-term current use of insulin [facility])  Assessment & Plan  Lab Results   Component Value Date    HGBA1C 8.8 (H) 07/03/2023       Recent Labs     08/22/23  1121 08/22/23  1601 08/22/23  2114 08/23/23  0813   POCGLU 332* 291* 341* 358*       Blood Sugar Average: Last 72 hrs:  (P) 331   · Uncontrolled evaluated by endocrinology yesterday today that endocrinology has increased his Lantus to 45 units at night and Humalog 15 units with meal he will be discharged with insulin we will have to provide education to the patient on the use. Hypernatremia  Assessment & Plan  · Initially improved to 151 now with corrected glucose is 152 will give another lite of nss and increase 1/2 nss to 150 ml /hr  · Repeat bmp in 1300    Near syncope  Assessment & Plan  · Patient reports near syncopal event while outside watching a baseball game. · In light of HHS dehydration no recurrence no dizziness when ambulating      Sinus tachycardia  Assessment & Plan  · -120 .  EKG shows sinus tachycardia improved was in the 80s but now on monitor 106 but with HHS due to patient and up to 10 L of fluid we will give him a liter bolus and increase his half-normal saline to 150 mL  · Patient denies chest pain , palpitation, sob. · Afebrile  · tsh nml    JOSH (acute kidney injury) ([address])  Assessment & Plan  · Baseline appears to be 0.7 , creat 1.41 on admission  · Secondary to HHS dehydration resolved        Schwannoma  Assessment & Plan  · Hx schwannoma L3-L5 , s/p surgery in 2017   · Follows with neurosurgery     Transaminitis  Assessment & Plan  · Mildly elevated AST/ALT , T bili normal (does appear improved from prior labs in 2021)  · Denies abdominal pain. Did have prior episode of nausea and vomiting . · Possible from testosterone use? 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 Patient Centered Rounds: I performed bedside rounds with nursing staff today. Discussions with Specialists or Other Care Team Provider: cm    Education and Discussions with Family / Patient: pt will update mother  Total Time Spent on Date of Encounter in care of patient: 35 minutes This time was spent on one or more of the following: performing physical exam; counseling and coordination of care; obtaining or reviewing history; documenting in the medical record; reviewing/ordering tests, medications or procedures; communicating with other healthcare professionals and discussing with patient's family/caregivers.     Current Length of Stay: 2 day(s)  Current Patient Status: Inpatient   Certification Statement: The patient will continue to require additional inpatient hospital stay due to Uncontrolled diabetes  Discharge Plan: Anticipate discharge tomorrow to home. Code Status: Level 1 - Full Code    Subjective:   Patient seen and examined no complaints ambulated without dizziness    Objective:     Vitals:   Temp (24hrs), Av.2 °F (36.8 °C), Min:98.1 °F (36.7 °C), Max:98.2 °F (36.8 °C)    Temp:  [98.1 °F (36.7 °C)-98.2 °F (36.8 °C)] 98.2 °F (36.8 °C)  HR:  [89] 89  Resp:  [18-21] 21  BP: (111-135)/(75-94) 111/75  SpO2:  [97 %] 97 %  Body mass index is 33.64 kg/m². Input and Output Summary (last 24 hours): Intake/Output Summary (Last 24 hours) at 2023 0941  Last data filed at 2023 0401  Gross per 24 hour   Intake 3699.59 ml   Output --   Net 3699.59 ml       Physical Exam:   Physical Exam  Vitals and nursing note reviewed. Constitutional:       General: He is not in acute distress. Appearance: He is well-developed. He is obese. HENT:      Head: Normocephalic and atraumatic. Eyes:      Conjunctiva/sclera: Conjunctivae normal.   Cardiovascular:      Rate and Rhythm: Normal rate and regular rhythm. Heart sounds: No murmur heard. Pulmonary:      Effort: Pulmonary effort is normal. No respiratory distress. Breath sounds: Normal breath sounds. No wheezing or rales. Abdominal:      General: Bowel sounds are normal. There is no distension. Palpations: Abdomen is soft. Tenderness: There is no abdominal tenderness. Musculoskeletal:         General: No swelling. Cervical back: Neck supple. Skin:     General: Skin is warm and dry. Capillary Refill: Capillary refill takes less than 2 seconds. Neurological:      Mental Status: He is alert and oriented to person, place, and time.    Psychiatric:         Mood and Affect: Mood normal.          Additional Data:     Labs:  Results from last 7 days   Lab Units 23  0519   WBC Thousand/uL 9.11   HEMOGLOBIN g/dL 14.4   HEMATOCRIT % 42.9   PLATELETS Thousands/uL 181   NEUTROS PCT % 48   LYMPHS PCT % 37   MONOS PCT % 11   EOS PCT % 3     Results from last 7 days   Lab Units 08/23/23  0504 08/22/23  1044 08/22/23  0519   SODIUM mmol/L 146   < > 151*   POTASSIUM mmol/L 3.6   < > 3.4*   CHLORIDE mmol/L 111*   < > 112*   CO2 mmol/L 29   < > 31   BUN mg/dL 7   < > 14   CREATININE mg/dL 0.80   < > 0.89   ANION GAP mmol/L 6   < > 8   CALCIUM mg/dL 8.6   < > 9.1   ALBUMIN g/dL  --   --  3.8   TOTAL BILIRUBIN mg/dL  --   --  0.49   ALK PHOS U/L  --   --  74   ALT U/L  --   --  65*   AST U/L  --   --  49*   GLUCOSE RANDOM mg/dL 361*   < > 267*    < > = values in this interval not displayed. Results from last 7 days   Lab Units 08/23/23  0813 08/22/23  2114 08/22/23  1601 08/22/23  1121 08/22/23  0854 08/22/23  0709 08/22/23  0517 08/22/23  0321 08/22/23  0101 08/22/23  0024 08/21/23  2306 08/21/23  2129   POC GLUCOSE mg/dl 358* 341* 291* 332* 361* 262* 263* 253* 399* 450* >500* >500*  >500*               Lines/Drains:  Invasive Devices     Peripheral Intravenous Line  Duration           Peripheral IV 08/21/23 Dorsal (posterior); Left Hand 1 day    Peripheral IV 08/21/23 Right Antecubital 1 day                      Imaging: No pertinent imaging reviewed.     Recent Cultures (last 7 days):         Last 24 Hours Medication List:   Current Facility-Administered Medications   Medication Dose Route Frequency Provider Last Rate   • acetaminophen  650 mg Oral Q6H PRN STEPHENIE Mitchell     • insulin glargine  45 Units Subcutaneous HS Shlomo Estevez, DO     • insulin lispro  1-6 Units Subcutaneous HS Shlomo Estevez, DO     • insulin lispro  15 Units Subcutaneous TID With Meals Tatiana Marisa Estevez, DO     • insulin lispro  2-12 Units Subcutaneous TID AC Shlomo Estevez, DO     • ondansetron  4 mg Intravenous Q6H PRN STEPHENIE Mitchell     • sodium chloride  1,000 mL/hr Intravenous Once Jinger Bound, MD     • sodium chloride  150 mL/hr Intravenous Continuous oDlly Rasmussen  mL/hr (08/23/23 1447)        Today, Patient Was Seen By: Shara Doran MD    **Please Note: This note may have been constructed using a voice recognition system. **

## 2023-08-23 NOTE — QUICK NOTE
Endocrinology quick note:    Chart reviewed. Patient not seen or examined by me personally. Persistent hyperglycemia. Increase lantus 50 units qHS, humalog 20 units qAC plus algorithm #5/4 AC/HS.  Will continue to monitor

## 2023-08-23 NOTE — ASSESSMENT & PLAN NOTE
Lab Results   Component Value Date    HGBA1C 8.8 (H) 07/03/2023       Recent Labs     08/22/23  1121 08/22/23  1601 08/22/23  2114 08/23/23  0813   POCGLU 332* 291* 341* 358*       Blood Sugar Average: Last 72 hrs:  (P) 331     · Presents after near syncopal episode while watching brothers soccer game. Reports fatigue and feeling generally unwell x 1 week. In ED, found to have blood sugar of 900. Hx DM2 on metformin for 2 years, does not check blood sugars at home. · Not in DKA- anion gap normal. VBG normal pH.    · Resolved  · On subcutaneous insulin

## 2023-08-24 ENCOUNTER — APPOINTMENT (INPATIENT)
Dept: ULTRASOUND IMAGING | Facility: HOSPITAL | Age: 23
DRG: 469 | End: 2023-08-24
Payer: COMMERCIAL

## 2023-08-24 LAB
ALBUMIN SERPL BCP-MCNC: 3.4 G/DL (ref 3.5–5)
ALP SERPL-CCNC: 64 U/L (ref 34–104)
ALT SERPL W P-5'-P-CCNC: 100 U/L (ref 7–52)
ANION GAP SERPL CALCULATED.3IONS-SCNC: 6 MMOL/L
ANION GAP SERPL CALCULATED.3IONS-SCNC: 8 MMOL/L
AST SERPL W P-5'-P-CCNC: 151 U/L (ref 13–39)
BILIRUB SERPL-MCNC: 0.73 MG/DL (ref 0.2–1)
BUN SERPL-MCNC: 7 MG/DL (ref 5–25)
BUN SERPL-MCNC: 7 MG/DL (ref 5–25)
CALCIUM ALBUM COR SERPL-MCNC: 8.7 MG/DL (ref 8.3–10.1)
CALCIUM SERPL-MCNC: 8.2 MG/DL (ref 8.4–10.2)
CALCIUM SERPL-MCNC: 8.7 MG/DL (ref 8.4–10.2)
CHLORIDE SERPL-SCNC: 109 MMOL/L (ref 96–108)
CHLORIDE SERPL-SCNC: 113 MMOL/L (ref 96–108)
CO2 SERPL-SCNC: 23 MMOL/L (ref 21–32)
CO2 SERPL-SCNC: 27 MMOL/L (ref 21–32)
CREAT SERPL-MCNC: 0.74 MG/DL (ref 0.6–1.3)
CREAT SERPL-MCNC: 0.77 MG/DL (ref 0.6–1.3)
GFR SERPL CREATININE-BSD FRML MDRD: 130 ML/MIN/1.73SQ M
GFR SERPL CREATININE-BSD FRML MDRD: 132 ML/MIN/1.73SQ M
GLUCOSE SERPL-MCNC: 162 MG/DL (ref 65–140)
GLUCOSE SERPL-MCNC: 176 MG/DL (ref 65–140)
GLUCOSE SERPL-MCNC: 181 MG/DL (ref 65–140)
GLUCOSE SERPL-MCNC: 199 MG/DL (ref 65–140)
GLUCOSE SERPL-MCNC: 228 MG/DL (ref 65–140)
GLUCOSE SERPL-MCNC: 245 MG/DL (ref 65–140)
OSMOLALITY UR: 331 MMOL/KG
POTASSIUM SERPL-SCNC: 3.6 MMOL/L (ref 3.5–5.3)
POTASSIUM SERPL-SCNC: 3.8 MMOL/L (ref 3.5–5.3)
PROT SERPL-MCNC: 5.9 G/DL (ref 6.4–8.4)
SARS-COV-2 RNA RESP QL NAA+PROBE: NEGATIVE
SODIUM SERPL-SCNC: 140 MMOL/L (ref 135–147)
SODIUM SERPL-SCNC: 146 MMOL/L (ref 135–147)

## 2023-08-24 PROCEDURE — 80048 BASIC METABOLIC PNL TOTAL CA: CPT | Performed by: NURSE PRACTITIONER

## 2023-08-24 PROCEDURE — 80053 COMPREHEN METABOLIC PANEL: CPT | Performed by: NURSE PRACTITIONER

## 2023-08-24 PROCEDURE — 76705 ECHO EXAM OF ABDOMEN: CPT

## 2023-08-24 PROCEDURE — 87340 HEPATITIS B SURFACE AG IA: CPT | Performed by: FAMILY MEDICINE

## 2023-08-24 PROCEDURE — 99255 IP/OBS CONSLTJ NEW/EST HI 80: CPT | Performed by: NURSE PRACTITIONER

## 2023-08-24 PROCEDURE — 86803 HEPATITIS C AB TEST: CPT | Performed by: FAMILY MEDICINE

## 2023-08-24 PROCEDURE — 87635 SARS-COV-2 COVID-19 AMP PRB: CPT | Performed by: FAMILY MEDICINE

## 2023-08-24 PROCEDURE — 99232 SBSQ HOSP IP/OBS MODERATE 35: CPT | Performed by: FAMILY MEDICINE

## 2023-08-24 PROCEDURE — 86704 HEP B CORE ANTIBODY TOTAL: CPT | Performed by: FAMILY MEDICINE

## 2023-08-24 PROCEDURE — 82948 REAGENT STRIP/BLOOD GLUCOSE: CPT

## 2023-08-24 PROCEDURE — 86705 HEP B CORE ANTIBODY IGM: CPT | Performed by: FAMILY MEDICINE

## 2023-08-24 PROCEDURE — 83935 ASSAY OF URINE OSMOLALITY: CPT | Performed by: NURSE PRACTITIONER

## 2023-08-24 RX ORDER — DESMOPRESSIN ACETATE 4 UG/ML
1 INJECTION, SOLUTION INTRAVENOUS; SUBCUTANEOUS ONCE
Status: COMPLETED | OUTPATIENT
Start: 2023-08-24 | End: 2023-08-24

## 2023-08-24 RX ADMIN — INSULIN LISPRO 4 UNITS: 100 INJECTION, SOLUTION INTRAVENOUS; SUBCUTANEOUS at 12:11

## 2023-08-24 RX ADMIN — INSULIN GLARGINE 50 UNITS: 100 INJECTION, SOLUTION SUBCUTANEOUS at 21:42

## 2023-08-24 RX ADMIN — INSULIN LISPRO 4 UNITS: 100 INJECTION, SOLUTION INTRAVENOUS; SUBCUTANEOUS at 17:24

## 2023-08-24 RX ADMIN — SODIUM CHLORIDE 150 ML/HR: 0.45 INJECTION, SOLUTION INTRAVENOUS at 04:54

## 2023-08-24 RX ADMIN — INSULIN LISPRO 8 UNITS: 100 INJECTION, SOLUTION INTRAVENOUS; SUBCUTANEOUS at 08:07

## 2023-08-24 RX ADMIN — INSULIN LISPRO 20 UNITS: 100 INJECTION, SOLUTION INTRAVENOUS; SUBCUTANEOUS at 08:08

## 2023-08-24 RX ADMIN — INSULIN LISPRO 2 UNITS: 100 INJECTION, SOLUTION INTRAVENOUS; SUBCUTANEOUS at 21:42

## 2023-08-24 RX ADMIN — DESMOPRESSIN ACETATE 1 MCG: 4 SOLUTION INTRAVENOUS at 10:48

## 2023-08-24 RX ADMIN — INSULIN LISPRO 20 UNITS: 100 INJECTION, SOLUTION INTRAVENOUS; SUBCUTANEOUS at 17:24

## 2023-08-24 NOTE — PLAN OF CARE
Problem: PAIN - ADULT  Goal: Verbalizes/displays adequate comfort level or baseline comfort level  Description: Interventions:  - Encourage patient to monitor pain and request assistance  - Assess pain using appropriate pain scale  - Administer analgesics based on type and severity of pain and evaluate response  - Implement non-pharmacological measures as appropriate and evaluate response  - Consider cultural and social influences on pain and pain management  - Notify physician/advanced practitioner if interventions unsuccessful or patient reports new pain  Outcome: Progressing     Problem: INFECTION - ADULT  Goal: Absence or prevention of progression during hospitalization  Description: INTERVENTIONS:  - Assess and monitor for signs and symptoms of infection  - Monitor lab/diagnostic results  - Monitor all insertion sites, i.e. indwelling lines, tubes, and drains  - Monitor endotracheal if appropriate and nasal secretions for changes in amount and color  - [place] appropriate cooling/warming therapies per order  - Administer medications as ordered  - Instruct and encourage patient and family to use good hand hygiene technique  - Identify and instruct in appropriate isolation precautions for identified infection/condition  Outcome: Progressing     Problem: SAFETY ADULT  Goal: Patient will remain free of falls  Description: INTERVENTIONS:  - Educate patient/family on patient safety including physical limitations  - Instruct patient to call for assistance with activity   - Consult OT/PT to assist with strengthening/mobility   - Keep Call bell within reach  - Keep bed low and locked with side rails adjusted as appropriate  - Keep care items and personal belongings within reach  - Initiate and maintain comfort rounds  - Make Fall Risk Sign visible to staff  - Offer Toileting every  Hours, in advance of need  - Initiate/Maintain alarm  - Obtain necessary fall risk management equipment:   - Apply yellow socks and bracelet for high fall risk patients  - Consider moving patient to room near nurses station  Outcome: Progressing     Problem: Nutrition/Hydration-ADULT  Goal: Nutrient/Hydration intake appropriate for improving, restoring or maintaining nutritional needs  Description: Monitor and assess patient's nutrition/hydration status for malnutrition. Collaborate with interdisciplinary team and initiate plan and interventions as ordered. Monitor patient's weight and dietary intake as ordered or per policy. Utilize nutrition screening tool and intervene as necessary. Determine patient's food preferences and provide high-protein, high-caloric foods as appropriate.      INTERVENTIONS:  - Monitor oral intake, urinary output, labs, and treatment plans  - Assess nutrition and hydration status and recommend course of action  - Evaluate amount of meals eaten  - Assist patient with eating if necessary   - Allow adequate time for meals  - Recommend/ encourage appropriate diets, oral nutritional supplements, and vitamin/mineral supplements  - Order, calculate, and assess calorie counts as needed  - Recommend, monitor, and adjust tube feedings and TPN/PPN based on assessed needs  - Assess need for intravenous fluids  - Provide specific nutrition/hydration education as appropriate  - Include patient/family/caregiver in decisions related to nutrition  Outcome: Progressing

## 2023-08-24 NOTE — ASSESSMENT & PLAN NOTE
· Follows with [facility] endocrinology   · Takes testosterone injections 2x monthly - hold while inpatient

## 2023-08-24 NOTE — ASSESSMENT & PLAN NOTE
· -120 . EKG shows sinus tachycardia improved improved secondary to dehydration. Down to 90- low 100s  · No hypoxia.   And TSH is normal

## 2023-08-24 NOTE — ASSESSMENT & PLAN NOTE
· Initially improved to 149 corrected  · As discussed with nephrology yesterday urine osmolality was done and was low suspected diabetes insipidus he did receive a DDAVP dose yesterday and he will receive 1 today BMP in the afternoon.   Fluids discontinued

## 2023-08-24 NOTE — ASSESSMENT & PLAN NOTE
· Mildly elevated AST/ALT , T bili normal (does appear improved from prior labs in 2021)  · Denies abdominal pain. Questionable secondary to testosterone use. He did have an elevation in 100s in 2021.   We will check a COVID ultrasound abdomen and pelvis hepatitis panel as they did go up again and initially trended down  · If tomorrow continue to go up we will ask evaluation by gastroenterology

## 2023-08-24 NOTE — PLAN OF CARE
Problem: PAIN - ADULT  Goal: Verbalizes/displays adequate comfort level or baseline comfort level  Description: Interventions:  - Encourage patient to monitor pain and request assistance  - Assess pain using appropriate pain scale  - Administer analgesics based on type and severity of pain and evaluate response  - Implement non-pharmacological measures as appropriate and evaluate response  - Consider cultural and social influences on pain and pain management  - Notify physician/advanced practitioner if interventions unsuccessful or patient reports new pain  Outcome: Progressing     Problem: INFECTION - ADULT  Goal: Absence or prevention of progression during hospitalization  Description: INTERVENTIONS:  - Assess and monitor for signs and symptoms of infection  - Monitor lab/diagnostic results  - Monitor all insertion sites, i.e. indwelling lines, tubes, and drains  - Monitor endotracheal if appropriate and nasal secretions for changes in amount and color  - [place] appropriate cooling/warming therapies per order  - Administer medications as ordered  - Instruct and encourage patient and family to use good hand hygiene technique  - Identify and instruct in appropriate isolation precautions for identified infection/condition  Outcome: Progressing  Goal: Absence of fever/infection during neutropenic period  Description: INTERVENTIONS:  - Monitor WBC    Outcome: Progressing     Problem: DISCHARGE PLANNING  Goal: Discharge to home or other facility with appropriate resources  Description: INTERVENTIONS:  - Identify barriers to discharge w/patient and caregiver  - Arrange for needed discharge resources and transportation as appropriate  - Identify discharge learning needs (meds, wound care, etc.)  - Arrange for interpretive services to assist at discharge as needed  - Refer to Case Management Department for coordinating discharge planning if the patient needs post-hospital services based on physician/advanced practitioner order or complex needs related to functional status, cognitive ability, or social support system  Outcome: Progressing     Problem: SAFETY ADULT  Goal: Patient will remain free of falls  Description: INTERVENTIONS:  - Educate patient/family on patient safety including physical limitations  - Instruct patient to call for assistance with activity   - Consult OT/PT to assist with strengthening/mobility   - Keep Call bell within reach  - Keep bed low and locked with side rails adjusted as appropriate  - Keep care items and personal belongings within reach  - Initiate and maintain comfort rounds  - Make Fall Risk Sign visible to staff  - Offer Toileting every 2 Hours, in advance of need  - Obtain necessary fall risk management equipment: non slip socks    - Apply yellow socks and bracelet for high fall risk patients  - Consider moving patient to room near nurses station  Outcome: Progressing     Problem: DISCHARGE PLANNING  Goal: Discharge to home or other facility with appropriate resources  Description: INTERVENTIONS:  - Identify barriers to discharge w/patient and caregiver  - Arrange for needed discharge resources and transportation as appropriate  - Identify discharge learning needs (meds, wound care, etc.)  - Arrange for interpretive services to assist at discharge as needed  - Refer to Case Management Department for coordinating discharge planning if the patient needs post-hospital services based on physician/advanced practitioner order or complex needs related to functional status, cognitive ability, or social support system  Outcome: Progressing     Problem: Knowledge Deficit  Goal: Patient/family/caregiver demonstrates understanding of disease process, treatment plan, medications, and discharge instructions  Description: Complete learning assessment and assess knowledge base.   Interventions:  - Provide teaching at level of understanding  - Provide teaching via preferred learning methods  Outcome: Progressing     Problem: Nutrition/Hydration-ADULT  Goal: Nutrient/Hydration intake appropriate for improving, restoring or maintaining nutritional needs  Description: Monitor and assess patient's nutrition/hydration status for malnutrition. Collaborate with interdisciplinary team and initiate plan and interventions as ordered. Monitor patient's weight and dietary intake as ordered or per policy. Utilize nutrition screening tool and intervene as necessary. Determine patient's food preferences and provide high-protein, high-caloric foods as appropriate.      INTERVENTIONS:  - Monitor oral intake, urinary output, labs, and treatment plans  - Assess nutrition and hydration status and recommend course of action  - Evaluate amount of meals eaten  - Assist patient with eating if necessary   - Allow adequate time for meals  - Recommend/ encourage appropriate diets, oral nutritional supplements, and vitamin/mineral supplements  - Order, calculate, and assess calorie counts as needed  - Recommend, monitor, and adjust tube feedings and TPN/PPN based on assessed needs  - Assess need for intravenous fluids  - Provide specific nutrition/hydration education as appropriate  - Include patient/family/caregiver in decisions related to nutrition  Outcome: Progressing

## 2023-08-24 NOTE — ASSESSMENT & PLAN NOTE
Lab Results   Component Value Date    HGBA1C 8.8 (H) 07/03/2023       Recent Labs     08/23/23  1141 08/23/23  1633 08/23/23  2110 08/24/23  0805   POCGLU 268* 298* 212* 245*       Blood Sugar Average: Last 72 hrs:  (P) 309.5   · Uncontrolled evaluated by endocrinology yesterday today that endocrinology has increased his Lantus to50 units at night and Humalog 20 units with meal he will be discharged with insulin we will have to provide education to the patient on the use.   · We will have nutrition meet with him as well

## 2023-08-24 NOTE — CONSULTS
CONSULTATION-NEPHROLOGY   Chuy Ann 23 y.o. male MRN: [id number]  Unit/Bed#: -01 Encounter: [id number]        Assessment and Plan:    1. Hypernatremia  · Ongoing despite appropriate access to free water and dilute fluids. Urine osmolality low at 209 mmol/kg which is concerning for potential diabetes insipidus. Discussed with Dr. Nidhi Haskins. Will discontinue hypotonic fluids, give 1 mcg DDAVP, check urine osmolality 2 hours later and then repeat BMP later today. Allow to drink to thirst.  Discussed with nursing and primary team.  · Does admit to polyuria and polydipsia however these are seen in HHS as well  2. Type 2 diabetes mellitus with HHS  · Management per endocrinology colleagues and primary team  3. Volume depletion  · In the setting of osmotic diuresis from severe hyperglycemia now resolved. Examines euvolemic  4. 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 Noted to have acute kidney injury upon arrival which is resolved and HHS state evaluated by endocrinology colleagues. Nephrology consulted today for hyponatremia    Upon discussion with the patient he relays HPI as above in addition: Patient states that recently he is becoming increasingly thirsty, increased urine output and feeling very dry. He is apparently always hydrating. He had a very busy weekend at several garcia and baseball games. He is supposed to start college next week. He denies nausea, vomiting, diarrhea, dysuria, urgency, frequency, chest pain, shortness of breath. I also discussed with his mother. Per his mother's reports, patient is always drinking water or juice.   He believes several empty bottles around the house every day. From a sodium perspective, persistently hyponatremic corrected for glucose since hospitalization despite free access to water and hypotonic fluids. History of sodium issues. Outpatient sodium appropriate in 2021. Reason for Consult: Hyponatremia, rule out diabetes insipidus    Review of Systems:  A complete 10-point review of systems was performed. Aside from what was mentioned in the HPI, it is otherwise negative. Historical Information   Past Medical History:   Diagnosis Date   • Diabetes mellitus ([address])      Past Surgical History:   Procedure Laterality Date   • BACK SURGERY       Social History   Social History     Substance and Sexual Activity   Alcohol Use None     Social History     Substance and Sexual Activity   Drug Use Never     Social History     Tobacco Use   Smoking Status Never   Smokeless Tobacco Never       Family History:   History reviewed. No pertinent family history. Medications:  Pertinent medications were reviewed  Current Facility-Administered Medications   Medication Dose Route Frequency Provider Last Rate   • acetaminophen  650 mg Oral Q6H PRN Cheryle Mitts Dimler, CRNP     • insulin glargine  50 Units Subcutaneous HS Shlomo Estevez, DO     • insulin lispro  2-12 Units Subcutaneous HS Shlomo Estevez DO     • insulin lispro  20 Units Subcutaneous TID With Meals John Estevez, DO     • insulin lispro  4-20 Units Subcutaneous TID  Shlomo Estevez DO     • ondansetron  4 mg Intravenous Q6H PRN Cheryle Mitts Dimler, CRNP           No Known Allergies      Vitals:   /78   Pulse 95   Temp 99 °F (37.2 °C)   Resp 18   Ht 5' 11" (1.803 m)   Wt 109 kg (241 lb 2.9 oz)   SpO2 98%   BMI 33.64 kg/m²   Body mass index is 33.64 kg/m².   SpO2: 98 %,   SpO2 Activity: At Rest,   O2 Device: None (Room air)      Intake/Output Summary (Last 24 hours) at 8/24/2023 1106  Last data filed at 8/24/2023 1054  Gross per 24 hour   Intake 4270.42 ml   Output 3675 ml   Net 595.42 ml     Invasive Devices     Peripheral Intravenous Line  Duration           Peripheral IV 08/21/23 Dorsal (posterior); Left Hand 2 days    Peripheral IV 08/24/23 Proximal;Right;Ventral (anterior) Forearm <1 day                Physical Exam:  General: conscious, cooperative, in no acute distress  Eyes: conjunctivae pink, anicteric sclerae  ENT: lips and mucous membranes moist  Neck: supple, no JVD, no masses  Chest: clear breath sounds bilateral, no crackles, ronchus or wheezings  CVS: S1 & S2, normal rate, regular rhythm  Abdomen: soft, non-tender, non-distended, normoactive bowel sounds  Extremities: no edema of both legs  Skin: no rash  Neuro: awake, alert, oriented      Diagnostic Data:  Lab: I have personally reviewed pertinent lab results. ,   CBC:  Results from last 7 days   Lab Units 08/22/23  0519   WBC Thousand/uL 9.11   HEMOGLOBIN g/dL 14.4   HEMATOCRIT % 42.9   PLATELETS Thousands/uL 181      CMP:   Lab Results   Component Value Date    SODIUM 146 08/24/2023    K 3.6 08/24/2023     (H) 08/24/2023    CO2 27 08/24/2023    BUN 7 08/24/2023    CREATININE 0.74 08/24/2023    CALCIUM 8.2 (L) 08/24/2023     (H) 08/24/2023     (H) 08/24/2023    ALKPHOS 64 08/24/2023    EGFR 132 08/24/2023   ,   PT/INR: No results found for: "PT", "INR",   Magnesium: No components found for: "MAG",  Phosphorous: No results found for: "PHOS"    Microbiology:  @[facility],(urinecx:7)@        [facility], CRNP    Portions of the record may have been created with voice recognition software. Occasional wrong word or "sound a like" substitutions may have occurred due to the inherent limitations of voice recognition software. Read the chart carefully and recognize, using context, where substitutions have occurred.

## 2023-08-24 NOTE — PROGRESS NOTES
[address] [facility]  Progress Note  Name: Alisa Jama  MRN: [id number]  Unit/Bed#: -01 I Date of Admission: 8/21/2023   Date of Service: 8/24/2023 I Hospital Day: 3    Assessment/Plan   * Hyperosmolar hyperglycemic state (HHS) [facility])  Assessment & Plan  Lab Results   Component Value Date    HGBA1C 8.8 (H) 07/03/2023       Recent Labs     08/23/23  1141 08/23/23  1633 08/23/23  2110 08/24/23  0805   POCGLU 268* 298* 212* 245*       Blood Sugar Average: Last 72 hrs:  (P) 309.5     · Presents after near syncopal episode while watching brothers soccer game. Reports fatigue and feeling generally unwell x 1 week. In ED, found to have blood sugar of 900. Hx DM2 on metformin for 2 years, does not check blood sugars at home. · Not in DKA- anion gap normal. VBG normal pH. · Resolved  · On subcutaneous insulin    Type 2 diabetes mellitus with hyperglycemia, without long-term current use of insulin [facility])  Assessment & Plan  Lab Results   Component Value Date    HGBA1C 8.8 (H) 07/03/2023       Recent Labs     08/23/23  1141 08/23/23  1633 08/23/23  2110 08/24/23  0805   POCGLU 268* 298* 212* 245*       Blood Sugar Average: Last 72 hrs:  (P) 309.5   · Uncontrolled evaluated by endocrinology yesterday today that endocrinology has increased his Lantus to50 units at night and Humalog 20 units with meal he will be discharged with insulin we will have to provide education to the patient on the use. · We will have nutrition meet with him as well    Hypernatremia  Assessment & Plan  · Initially improved to 149 corrected  · As discussed with nephrology yesterday urine osmolality was done and was low suspected diabetes insipidus he did receive a DDAVP dose yesterday and he will receive 1 today BMP in the afternoon. Fluids discontinued    Near syncope  Assessment & Plan  · Patient reports near syncopal event while outside watching a baseball game.    · In light of HHS dehydration no recurrence no dizziness when ambulating      Sinus tachycardia  Assessment & Plan  · -120 . EKG shows sinus tachycardia improved improved secondary to dehydration. Down to 90- low 100s  · No hypoxia. And TSH is normal    JOSH (acute kidney injury) ([address])  Assessment & Plan  · Baseline appears to be 0.7 , creat 1.41 on admission  · Secondary to HHS dehydration resolved        Schwannoma  Assessment & Plan  · Hx schwannoma L3-L5 , s/p surgery in 2017   · Follows with neurosurgery     Transaminitis  Assessment & Plan  · Mildly elevated AST/ALT , T bili normal (does appear improved from prior labs in 2021)  · Denies abdominal pain. Questionable secondary to testosterone use. He did have an elevation in 100s in 2021. We will check a COVID ultrasound abdomen and pelvis hepatitis panel as they did go up again and initially trended down  · If tomorrow continue to go up we will ask evaluation by gastroenterology    Morning glory optic disc anomaly associated with mutation in PAX6 gene ([address])  Assessment & Plan  · Chronic since birth , strabismus     Growth hormone deficiency ([address])  Assessment & Plan  · Follows with [facility]ocrinology   · Takes testosterone injections 2x monthly - hold while inpatient             VTE Pharmacologic Prophylaxis:   Low Risk (Score 0-2) - Encourage Ambulation. Patient Centered Rounds: I performed bedside rounds with nursing staff today. Discussions with Specialists or Other Care Team Provider: Nephrology    Education and Discussions with Family / Patient: pt  Total Time Spent on Date of Encounter in care of patient: 35 minutes This time was spent on one or more of the following: performing physical exam; counseling and coordination of care; obtaining or reviewing history; documenting in the medical record; reviewing/ordering tests, medications or procedures; communicating with other healthcare professionals and discussing with patient's family/caregivers.     Current Length of Stay: 3 day(s)  Current Patient Status: Inpatient   Certification Statement: The patient will continue to require additional inpatient hospital stay due to Secondary to hyperglycemia hypernatremia  Discharge Plan: Anticipate discharge in 24-48 hrs to home. Code Status: Level 1 - Full Code    Subjective:   Patient seen and examined he denies any complaints except that his left hand is swollen where the IV is    Objective:     Vitals:   Temp (24hrs), Av.9 °F (37.2 °C), Min:98.6 °F (37 °C), Max:99 °F (37.2 °C)    Temp:  [98.6 °F (37 °C)-99 °F (37.2 °C)] 99 °F (37.2 °C)  HR:  [] 95  Resp:  [18-19] 18  BP: (127-137)/(78-95) 135/78  SpO2:  [94 %-98 %] 98 %  Body mass index is 33.64 kg/m². Input and Output Summary (last 24 hours): Intake/Output Summary (Last 24 hours) at 2023 0958  Last data filed at 2023 0454  Gross per 24 hour   Intake 3790.42 ml   Output 2475 ml   Net 1315.42 ml       Physical Exam:   Physical Exam  Vitals and nursing note reviewed. Constitutional:       General: He is not in acute distress. Appearance: He is well-developed. HENT:      Head: Normocephalic and atraumatic. Eyes:      Conjunctiva/sclera: Conjunctivae normal.   Cardiovascular:      Rate and Rhythm: Normal rate and regular rhythm. Heart sounds: No murmur heard. Pulmonary:      Effort: Pulmonary effort is normal. No respiratory distress. Breath sounds: Normal breath sounds. No wheezing or rales. Abdominal:      General: Bowel sounds are normal. There is no distension. Palpations: Abdomen is soft. Tenderness: There is no abdominal tenderness. Musculoskeletal:         General: Swelling (Left upper extremity) present. Cervical back: Neck supple. Skin:     General: Skin is warm and dry. Capillary Refill: Capillary refill takes less than 2 seconds. Neurological:      Mental Status: He is alert.    Psychiatric:         Mood and Affect: Mood normal.          Additional Data:     Labs:  Results from last 7 days   Lab Units 08/22/23  0519   WBC Thousand/uL 9.11   HEMOGLOBIN g/dL 14.4   HEMATOCRIT % 42.9   PLATELETS Thousands/uL 181   NEUTROS PCT % 48   LYMPHS PCT % 37   MONOS PCT % 11   EOS PCT % 3     Results from last 7 days   Lab Units 08/24/23  0449   SODIUM mmol/L 146   POTASSIUM mmol/L 3.6   CHLORIDE mmol/L 113*   CO2 mmol/L 27   BUN mg/dL 7   CREATININE mg/dL 0.74   ANION GAP mmol/L 6   CALCIUM mg/dL 8.2*   ALBUMIN g/dL 3.4*   TOTAL BILIRUBIN mg/dL 0.73   ALK PHOS U/L 64   ALT U/L 100*   AST U/L 151*   GLUCOSE RANDOM mg/dL 228*         Results from last 7 days   Lab Units 08/24/23  0805 08/23/23  2110 08/23/23  1633 08/23/23  1141 08/23/23  0813 08/22/23  2114 08/22/23  1601 08/22/23  1121 08/22/23  0854 08/22/23  0709 08/22/23  0517 08/22/23  0321   POC GLUCOSE mg/dl 245* 212* 298* 268* 358* 341* 291* 332* 361* 262* 263* 253*               Lines/Drains:  Invasive Devices     Peripheral Intravenous Line  Duration           Peripheral IV 08/21/23 Dorsal (posterior); Left Hand 2 days                      Imaging: No pertinent imaging reviewed. Recent Cultures (last 7 days):         Last 24 Hours Medication List:   Current Facility-Administered Medications   Medication Dose Route Frequency Provider Last Rate   • acetaminophen  650 mg Oral Q6H PRN STEPHENIE Chou     • desmopressin  1 mcg Intravenous Once STEPHENIE Vaughan     • insulin glargine  50 Units Subcutaneous HS Shlomo Estevez, DO     • insulin lispro  2-12 Units Subcutaneous HS Shlomo Estevez, DO     • insulin lispro  20 Units Subcutaneous TID With Meals Mauro Estevez, DO     • insulin lispro  4-20 Units Subcutaneous TID AC Shlomo Estevez, DO     • ondansetron  4 mg Intravenous Q6H PRN STEPHENIE Chou          Today, Patient Was Seen By: Gissell Bradley MD    **Please Note: This note may have been constructed using a voice recognition system. **

## 2023-08-24 NOTE — ASSESSMENT & PLAN NOTE
Lab Results   Component Value Date    HGBA1C 8.8 (H) 07/03/2023       Recent Labs     08/23/23  1141 08/23/23  1633 08/23/23  2110 08/24/23  0805   POCGLU 268* 298* 212* 245*       Blood Sugar Average: Last 72 hrs:  (P) 309.5     · Presents after near syncopal episode while watching brothers soccer game. Reports fatigue and feeling generally unwell x 1 week. In ED, found to have blood sugar of 900. Hx DM2 on metformin for 2 years, does not check blood sugars at home. · Not in DKA- anion gap normal. VBG normal pH.    · Resolved  · On subcutaneous insulin

## 2023-08-25 VITALS
DIASTOLIC BLOOD PRESSURE: 75 MMHG | TEMPERATURE: 98.6 F | WEIGHT: 245.7 LBS | SYSTOLIC BLOOD PRESSURE: 129 MMHG | BODY MASS INDEX: 34.4 KG/M2 | RESPIRATION RATE: 16 BRPM | HEART RATE: 97 BPM | HEIGHT: 71 IN | OXYGEN SATURATION: 96 %

## 2023-08-25 LAB
ALBUMIN SERPL BCP-MCNC: 3.3 G/DL (ref 3.5–5)
ALP SERPL-CCNC: 61 U/L (ref 34–104)
ALT SERPL W P-5'-P-CCNC: 108 U/L (ref 7–52)
ANION GAP SERPL CALCULATED.3IONS-SCNC: 6 MMOL/L
AST SERPL W P-5'-P-CCNC: 157 U/L (ref 13–39)
BILIRUB SERPL-MCNC: 0.93 MG/DL (ref 0.2–1)
BUN SERPL-MCNC: 6 MG/DL (ref 5–25)
CALCIUM ALBUM COR SERPL-MCNC: 8.8 MG/DL (ref 8.3–10.1)
CALCIUM SERPL-MCNC: 8.2 MG/DL (ref 8.4–10.2)
CHLORIDE SERPL-SCNC: 106 MMOL/L (ref 96–108)
CO2 SERPL-SCNC: 26 MMOL/L (ref 21–32)
CREAT SERPL-MCNC: 0.6 MG/DL (ref 0.6–1.3)
GFR SERPL CREATININE-BSD FRML MDRD: 144 ML/MIN/1.73SQ M
GLUCOSE SERPL-MCNC: 200 MG/DL (ref 65–140)
GLUCOSE SERPL-MCNC: 224 MG/DL (ref 65–140)
GLUCOSE SERPL-MCNC: 247 MG/DL (ref 65–140)
HBV CORE AB SER QL: NORMAL
HBV CORE IGM SER QL: NORMAL
HBV SURFACE AG SER QL: NORMAL
HCV AB SER QL: NORMAL
POTASSIUM SERPL-SCNC: 3.2 MMOL/L (ref 3.5–5.3)
PROT SERPL-MCNC: 5.9 G/DL (ref 6.4–8.4)
SODIUM SERPL-SCNC: 138 MMOL/L (ref 135–147)

## 2023-08-25 PROCEDURE — 99232 SBSQ HOSP IP/OBS MODERATE 35: CPT | Performed by: NURSE PRACTITIONER

## 2023-08-25 PROCEDURE — 99239 HOSP IP/OBS DSCHRG MGMT >30: CPT | Performed by: FAMILY MEDICINE

## 2023-08-25 PROCEDURE — 80053 COMPREHEN METABOLIC PANEL: CPT | Performed by: NURSE PRACTITIONER

## 2023-08-25 PROCEDURE — 82948 REAGENT STRIP/BLOOD GLUCOSE: CPT

## 2023-08-25 RX ORDER — INSULIN GLARGINE 100 [IU]/ML
60 INJECTION, SOLUTION SUBCUTANEOUS
Status: DISCONTINUED | OUTPATIENT
Start: 2023-08-25 | End: 2023-08-25 | Stop reason: HOSPADM

## 2023-08-25 RX ORDER — PEN NEEDLE, DIABETIC 32GX 5/32"
NEEDLE, DISPOSABLE MISCELLANEOUS
Qty: 100 EACH | Refills: 0 | Status: SHIPPED | OUTPATIENT
Start: 2023-08-25

## 2023-08-25 RX ORDER — LANCETS 33 GAUGE
EACH MISCELLANEOUS
Qty: 200 EACH | Refills: 0 | Status: SHIPPED | OUTPATIENT
Start: 2023-08-25

## 2023-08-25 RX ORDER — INSULIN LISPRO 100 [IU]/ML
20 INJECTION, SOLUTION INTRAVENOUS; SUBCUTANEOUS
Qty: 15 ML | Refills: 0 | Status: SHIPPED | OUTPATIENT
Start: 2023-08-25

## 2023-08-25 RX ORDER — GLUCOSAMINE HCL/CHONDROITIN SU 500-400 MG
CAPSULE ORAL
Qty: 200 EACH | Refills: 0 | Status: SHIPPED | OUTPATIENT
Start: 2023-08-25

## 2023-08-25 RX ORDER — BLOOD SUGAR DIAGNOSTIC
STRIP MISCELLANEOUS
Qty: 200 EACH | Refills: 0 | Status: SHIPPED | OUTPATIENT
Start: 2023-08-25

## 2023-08-25 RX ORDER — INSULIN LISPRO 100 [IU]/ML
20 INJECTION, SOLUTION INTRAVENOUS; SUBCUTANEOUS
Qty: 15 ML | Refills: 0 | Status: SHIPPED | OUTPATIENT
Start: 2023-08-25 | End: 2023-08-25 | Stop reason: SDUPTHER

## 2023-08-25 RX ORDER — BLOOD-GLUCOSE METER
KIT MISCELLANEOUS
Qty: 1 KIT | Refills: 0 | Status: SHIPPED | OUTPATIENT
Start: 2023-08-25

## 2023-08-25 RX ORDER — INSULIN GLARGINE 100 [IU]/ML
60 INJECTION, SOLUTION SUBCUTANEOUS
Qty: 15 ML | Refills: 2 | Status: SHIPPED | OUTPATIENT
Start: 2023-08-25

## 2023-08-25 RX ORDER — POTASSIUM CHLORIDE 20 MEQ/1
40 TABLET, EXTENDED RELEASE ORAL ONCE
Status: COMPLETED | OUTPATIENT
Start: 2023-08-25 | End: 2023-08-25

## 2023-08-25 RX ADMIN — INSULIN LISPRO 20 UNITS: 100 INJECTION, SOLUTION INTRAVENOUS; SUBCUTANEOUS at 08:02

## 2023-08-25 RX ADMIN — POTASSIUM CHLORIDE 40 MEQ: 1500 TABLET, EXTENDED RELEASE ORAL at 08:10

## 2023-08-25 RX ADMIN — INSULIN LISPRO 20 UNITS: 100 INJECTION, SOLUTION INTRAVENOUS; SUBCUTANEOUS at 11:55

## 2023-08-25 RX ADMIN — INSULIN LISPRO 8 UNITS: 100 INJECTION, SOLUTION INTRAVENOUS; SUBCUTANEOUS at 11:55

## 2023-08-25 RX ADMIN — INSULIN LISPRO 4 UNITS: 100 INJECTION, SOLUTION INTRAVENOUS; SUBCUTANEOUS at 08:02

## 2023-08-25 NOTE — CASE MANAGEMENT
Case Management Discharge Planning Note    Patient name Sheryl Chavez  Location [address] [facility] Prem 225/-18 MRN [id number]  : 2000 Date 2023       Current Admission Date: 2023  Current Admission Diagnosis:Hyperosmolar hyperglycemic state (HHS) [facility])   Patient Active Problem List    Diagnosis Date Noted   • Hypernatremia 2023   • Type 2 diabetes mellitus with hyperglycemia, without long-term current use of insulin ([address]) 2023   • Sinus tachycardia 2023   • Near syncope 2023   • Type 2 diabetes mellitus ([address])    • Growth hormone deficiency ([address])    • Hyperosmolar hyperglycemic state (HHS) ([address])    • Morning glory optic disc anomaly associated with mutation in PAX6 gene ([address])    • Transaminitis    • Schwannoma    • JOSH (acute kidney injury) ([address])       LOS (days): 4  Geometric Mean LOS (GMLOS) (days):   Days to GMLOS:     OBJECTIVE:  Risk of Unplanned Readmission Score: 10.04         Current admission status: Inpatient   Preferred Pharmacy:   [address] [place] [address]  Phone: [phone] Fax: [phone]    Primary Care Provider: Janiya Brown MD    Primary Insurance: [facility]  Secondary Insurance:     DISCHARGE DETAILS:    Patient is being dc today. CM followed up with [facility] and all medications/ insulin is available for  and cost of insulin is 0. MD is aware. DC plan is home.

## 2023-08-25 NOTE — ASSESSMENT & PLAN NOTE
· -120 . EKG shows sinus tachycardia improved improved secondary to dehydration. Down to 90's  · No hypoxia.   And TSH is normal

## 2023-08-25 NOTE — DISCHARGE INSTR - AVS FIRST PAGE
Please discuss with who is prescribing testosterone injections if has to be adjusted as he has elevation of liver numbers for quite a while- before next injection   Also will refer to gastroenterology to monitor as he has fatty liver which can contribute as well  Labs in one week  No more then 2g of tylenol a day if needed      Please call Daniele Tamayo' endocrinologist in regards to hypernatremia despite liberal free water intake and hypotonic fluids. Please let them know that he received 1 mcg IV DDAVP with noted increase in urine osmolality (from 209 mmol/Kg->331 mmol/Kg). Nephrology concerned for central DI process. Serum sodium appropriate day of discharge 140 mmol/l.  Recommend repeat testing and brain MRI

## 2023-08-25 NOTE — PLAN OF CARE
Problem: PAIN - ADULT  Goal: Verbalizes/displays adequate comfort level or baseline comfort level  Description: Interventions:  - Encourage patient to monitor pain and request assistance  - Assess pain using appropriate pain scale  - Administer analgesics based on type and severity of pain and evaluate response  - Implement non-pharmacological measures as appropriate and evaluate response  - Consider cultural and social influences on pain and pain management  - Notify physician/advanced practitioner if interventions unsuccessful or patient reports new pain  Outcome: Progressing     Problem: INFECTION - ADULT  Goal: Absence or prevention of progression during hospitalization  Description: INTERVENTIONS:  - Assess and monitor for signs and symptoms of infection  - Monitor lab/diagnostic results  - Monitor all insertion sites, i.e. indwelling lines, tubes, and drains  - Monitor endotracheal if appropriate and nasal secretions for changes in amount and color  - [place] appropriate cooling/warming therapies per order  - Administer medications as ordered  - Instruct and encourage patient and family to use good hand hygiene technique  - Identify and instruct in appropriate isolation precautions for identified infection/condition  Outcome: Progressing  Goal: Absence of fever/infection during neutropenic period  Description: INTERVENTIONS:  - Monitor WBC    Outcome: Progressing     Outcome: Progressing  Goal: Maintain or return to baseline ADL function  Description: INTERVENTIONS:  -  Assess patient's ability to carry out ADLs; assess patient's baseline for ADL function and identify physical deficits which impact ability to perform ADLs (bathing, care of mouth/teeth, toileting, grooming, dressing, etc.)  - Assess/evaluate cause of self-care deficits   - Assess range of motion  - Assess patient's mobility; develop plan if impaired  - Assess patient's need for assistive devices and provide as appropriate  - Encourage maximum independence but intervene and supervise when necessary  - Involve family in performance of ADLs  - Assess for home care needs following discharge   - Consider OT consult to assist with ADL evaluation and planning for discharge  - Provide patient education as appropriate  Outcome: Progressing    Problem: DISCHARGE PLANNING  Goal: Discharge to home or other facility with appropriate resources  Description: INTERVENTIONS:  - Identify barriers to discharge w/patient and caregiver  - Arrange for needed discharge resources and transportation as appropriate  - Identify discharge learning needs (meds, wound care, etc.)  - Arrange for interpretive services to assist at discharge as needed  - Refer to Case Management Department for coordinating discharge planning if the patient needs post-hospital services based on physician/advanced practitioner order or complex needs related to functional status, cognitive ability, or social support system  Outcome: Progressing

## 2023-08-25 NOTE — ASSESSMENT & PLAN NOTE
Lab Results   Component Value Date    HGBA1C 8.8 (H) 07/03/2023       Recent Labs     08/24/23  1621 08/24/23  2059 08/25/23  0743 08/25/23  1114   POCGLU 181* 199* 200* 247*       Blood Sugar Average: Last 72 hrs:  (P) 095.5047008260586603     · Presents after near syncopal episode while watching brothers soccer game. Reports fatigue and feeling generally unwell x 1 week. In ED, found to have blood sugar of 900. Hx DM2 on metformin for 2 years, does not check blood sugars at home. · Not in DKA- anion gap normal. VBG normal pH.    · Resolved  · On subcutaneous insulin

## 2023-08-25 NOTE — DISCHARGE SUMMARY
[address] [facility]  Discharge- Kamila Poor 2000, 23 y.o. male MRN: [id number]  Unit/Bed#: MS [facility] Encounter: [id number]  Primary Care Provider: Kenny Lyle MD   Date and time admitted to hospital: 8/21/2023  9:25 PM    * Hyperosmolar hyperglycemic state (HHS) [facility])  Assessment & Plan  Lab Results   Component Value Date    HGBA1C 8.8 (H) 07/03/2023       Recent Labs     08/24/23  1621 08/24/23  2059 08/25/23  0743 08/25/23  1114   POCGLU 181* 199* 200* 247*       Blood Sugar Average: Last 72 hrs:  (P) 785.4196654702734934     · Presents after near syncopal episode while watching brothers soccer game. Reports fatigue and feeling generally unwell x 1 week. In ED, found to have blood sugar of 900. Hx DM2 on metformin for 2 years, does not check blood sugars at home. · Not in DKA- anion gap normal. VBG normal pH. · Resolved  · On subcutaneous insulin    Type 2 diabetes mellitus with hyperglycemia, without long-term current use of insulin [facility])  Assessment & Plan  Lab Results   Component Value Date    HGBA1C 8.8 (H) 07/03/2023       Recent Labs     08/24/23  1621 08/24/23 2059 08/25/23  0743 08/25/23  1114   POCGLU 181* 199* 200* 247*       Blood Sugar Average: Last 72 hrs:  (P) 256.5410072677895406   · Uncontrolled evaluated by endocrinology yesterday today that endocrinology has increased his Lantus to 60 units at night and Humalog 20 units with meal he will be discharged with insulin we will have to provide education to the patient on the use.   · He has met with nutrition and provided education and outpatient referral.  Discussed with Dr. Kamla Nuñez will discharge on the current doses all sent to the pharmacy and all the supplies as well and he was of received pens and needles    Hypernatremia  Assessment & Plan  · Initially improved to 149 corrected  · As discussed with nephrology yesterday urine osmolality was done and was low suspected diabetes insipidus he did receive a DDAVP dose 8/23 with improvement - discussed with nephro concern for central DI needs to follow up with endo outpatient  Patient has already established and repeat labs in a week    Near syncope  Assessment & Plan  · Patient reports near syncopal event while outside watching a baseball game. · In light of HHS dehydration no recurrence no dizziness when ambulating      Sinus tachycardia  Assessment & Plan  · -120 . EKG shows sinus tachycardia improved improved secondary to dehydration. Down to 90's  · No hypoxia. And TSH is normal    JOSH (acute kidney injury) ([address])  Assessment & Plan  · Baseline appears to be 0.7 , creat 1.41 on admission  · Secondary to HHS dehydration resolved        Schwannoma  Assessment & Plan  · Hx schwannoma L3-L5 , s/p surgery in 2017   · Follows with neurosurgery     Transaminitis  Assessment & Plan  · Mildly elevated AST/ALT , T bili normal (does appear improved from prior labs in 2021) which was on admission  · Denies abdominal pain. Questionable secondary to testosterone use. He did have an elevation in 100s in 2021. Tentatively patient's LFTs stayed the same range only minimal elevation of yesterday T. bili is normal no abdominal pain ultrasound shows fatty liver COVID is negative chronic hepatitis panel is negative.   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 ([address])  Assessment & Plan  · Chronic since birth , strabismus     Growth hormone deficiency ([address])  Assessment & Plan  · Follows with [facility]ocrinology   · Takes testosterone injections 2x monthly - hold while inpatient      Medical Problems     Resolved Problems  Date Reviewed: 8/25/2023   None       Discharging Physician / Practitioner: Samul Sandifer, MD  PCP: Sandie Murray MD  Admission Date:   Admission Orders (From admission, onward)     Ordered        08/21/23 2043 [address]  Once                      Discharge Date: 08/25/23    Consultations During Hospital Stay:  · Nephrology    Procedures Performed:   · none    Significant Findings / Test Results:   · Us of right upper quadrant-hepatomegaly and hepatic steatosis    Incidental Findings:   · none    Test Results Pending at Discharge (will require follow up):   · none     Outpatient Tests Requested:  · Bmp, hepatic panel in one week    Complications:  none    Reason for Admission: Near syncope    Hospital Course:   Daniel Langford is a 23 y.o. male patient who originally presented to the hospital on 8/21/2023 due to syncope found to have HHS started IV insulin and IV fluids. HHS with resolution consulted endocrinology subcutaneous Lantus and Humalog has been started and adjusted with improvement of sugars patient was persistently hyponatremic despite adequate hydration urine osmolality was done with suspected DI he was given DDAVP with improvement discussed with nephrology concern for central diabetes insipidus he needs to follow-up with endocrinology for which she has established care with. He is to repeat his BMP as an outpatient in 1 week. Cleared from nephrology standpoint to be discharged in terms of the LFTs see above otherwise medical cleared to be discharged        Please see above list of diagnoses and related plan for additional information.      Condition at Discharge: stable    Discharge Day Visit / Exam:   Subjective: Patient seen and examined denies any complaints  Vitals: Blood Pressure: 129/75 (08/25/23 0728)  Pulse: 97 (08/25/23 0728)  Temperature: 98.6 °F (37 °C) (08/25/23 0728)  Temp Source: Temporal (08/24/23 2345)  Respirations: 16 (08/25/23 0728)  Height: 5' 11" (180.3 cm) (08/24/23 1020)  Weight - Scale: 111 kg (245 lb 11.2 oz) (08/25/23 0542)  SpO2: 96 % (08/25/23 0728)  Exam:   Physical Exam  Vitals and nursing note reviewed. Constitutional:       General: He is not in acute distress. Appearance: He is well-developed. HENT:      Head: Normocephalic and atraumatic. Eyes:      Conjunctiva/sclera: Conjunctivae normal.   Cardiovascular:      Rate and Rhythm: Normal rate and regular rhythm. Heart sounds: No murmur heard. Pulmonary:      Effort: Pulmonary effort is normal. No respiratory distress. Breath sounds: Normal breath sounds. No wheezing or rales. Abdominal:      General: There is no distension. Palpations: Abdomen is soft. Tenderness: There is no abdominal tenderness. Musculoskeletal:         General: No swelling. Cervical back: Neck supple. Skin:     General: Skin is warm and dry. Capillary Refill: Capillary refill takes less than 2 seconds. Neurological:      Mental Status: He is alert and oriented to person, place, and time. Psychiatric:         Mood and Affect: Mood normal.          Discussion with Family: Updated  (mother) via phone. Discharge instructions/Information to patient and family:   See after visit summary for information provided to patient and family. Provisions for Follow-Up Care:  See after visit summary for information related to follow-up care and any pertinent home health orders. Disposition:   Home    Planned Readmission: no     Discharge Statement:  I spent >35 minutes discharging the patient. This time was spent on the day of discharge. I had direct contact with the patient on the day of discharge. Greater than 50% of the total time was spent examining patient, answering all patient questions, arranging and discussing plan of care with patient as well as directly providing post-discharge instructions. Additional time then spent on discharge activities. Discharge Medications:  See after visit summary for reconciled discharge medications provided to patient and/or family.       **Please Note: This note may have been constructed using a voice recognition system**

## 2023-08-25 NOTE — ASSESSMENT & PLAN NOTE
· Mildly elevated AST/ALT , T bili normal (does appear improved from prior labs in 2021) which was on admission  · Denies abdominal pain. Questionable secondary to testosterone use. He did have an elevation in 100s in 2021. Tentatively patient's LFTs stayed the same range only minimal elevation of yesterday T. bili is normal no abdominal pain ultrasound shows fatty liver COVID is negative chronic hepatitis panel is negative.   · Discussed with the mother to discuss with endocrinology in terms of testosterone as he had persistent elevation of LFTs since 2021 if that could be the cause prior to providing further injection also we discussed diet in terms of the fatty liver as AST is greater than ALT could contribute if Tylenol to be used not more than 2 g in 24 hours also outpatient referral to gastroenterology repeat hepatic panel in a week

## 2023-08-25 NOTE — PROGRESS NOTES
NEPHROLOGY PROGRESS NOTE   Briana Ramirez 23 y.o. male MRN: [id number]  Unit/Bed#: -01 Encounter: [id number]    Assessment/Plan:    24 yo man with T2DM, PAX6 genetic mutation, growth hormone deficiency, history of schwannoma presented 8/21 with syncopal episode and blurry vision found to have HHS state. Nephrology consulted for hypernatremia, polyuria    1. Hypernatremia  · Urine osmolality greater than 300 mmol/kg following 1 mcg DDAVP. Serum sodium normal this AM.  No documentation of polyuria however unclear how accurate IO tab is. D/w D.r Frutoso Cousins given increase in urine osmolality following DDAVP concern for central process. Recommend outpatient follow-up with endocrinology colleagues and potential mri of brain. 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 She states his yearly MRIs have been stable. He follows with [facility] endocrinology. She will call for appointment ASAP. 2.  Hypokalemia  · Give 40 mill equivalents oral potassium now        ROS  No physical complaints. Would like to go home A complete 10 point review of systems have been performed and are otherwise negative. Historical Information   Past Medical History:   Diagnosis Date   • Diabetes mellitus ([address])      Past Surgical History:   Procedure Laterality Date   • BACK SURGERY     • CT NEEDLE BIOPSY MEDIASTINUM  4/17/2017     Social History   Social History     Substance and Sexual Activity   Alcohol Use Not Currently     Social History     Substance and Sexual Activity   Drug Use Never     Social History     Tobacco Use   Smoking Status Never   Smokeless Tobacco Never       Family History:   History reviewed. No pertinent family history.     Medications:  Pertinent medications were reviewed  Current Facility-Administered Medications   Medication Dose Route Frequency Provider Last Rate   • acetaminophen  650 mg Oral Q6H PRN STEPHENIE Green     • insulin glargine  60 Units Subcutaneous HS Shlomo Goldstein Mimms, DO     • insulin lispro  2-12 Units Subcutaneous HS [facility]lomo Goldstein [facility]     • insulin lispro  20 Units Subcutaneous TID With Meals Oneal Britton Mimms, DO     • insulin lispro  4-20 Units Subcutaneous TID AC Shlomo Goldstein Mimms, DO     • ondansetron  4 mg Intravenous Q6H PRN DELVIN GreenNP           No Known Allergies      Vitals:   /75   Pulse 97   Temp 98.6 °F (37 °C)   Resp 16   Ht 5' 11" (1.803 m)   Wt 111 kg (245 lb 11.2 oz)   SpO2 96%   BMI 34.27 kg/m²   Body mass index is 34.27 kg/m². SpO2: 96 %,   SpO2 Activity: At Rest,   O2 Device: None (Room air)      Intake/Output Summary (Last 24 hours) at 8/25/2023 0905  Last data filed at 8/25/2023 2708  Gross per 24 hour   Intake 1440 ml   Output 1700 ml   Net -260 ml     Invasive Devices     Peripheral Intravenous Line  Duration           Peripheral IV 08/24/23 Proximal;Right;Ventral (anterior) Forearm <1 day                Physical Exam  General: conscious, cooperative, in no acute distress  Eyes: conjunctivae pink, anicteric sclerae  ENT: lips and mucous membranes moist  Neck: supple, no JVD, no masses  Chest: clear breath sounds bilaterally, no crackles, ronchus or wheezings  CVS: distinct S1 & S2, normal rate, regular rhythm  Abdomen: soft, non-tender, non-distended, normoactive bowel sounds  Extremities: no edema of both legs  Skin: no rash  Neuro: awake, alert, oriented      Diagnostic Data:  Lab: I have personally reviewed pertinent lab results. ,   CBC:  Results from last 7 days   Lab Units 08/22/23  0519   WBC Thousand/uL 9.11   HEMOGLOBIN g/dL 14.4   HEMATOCRIT % 42.9   PLATELETS Thousands/uL 181      CMP:   Lab Results   Component Value Date    SODIUM 138 08/25/2023    K 3.2 (L) 08/25/2023     08/25/2023    CO2 26 08/25/2023    BUN 6 08/25/2023    CREATININE 0.60 08/25/2023    CALCIUM 8.2 (L) 08/25/2023     (H) 08/25/2023     (H) 08/25/2023    ALKPHOS 61 08/25/2023    EGFR 144 08/25/2023   ,   PT/INR: No results found for: "PT", "INR",   Magnesium: No components found for: "MAG",  Phosphorous: No results found for: "PHOS"    Microbiology:  @[facility],(urinecx:7)@        STEPHENIE Shaw    Portions of the record may have been created with voice recognition software. Occasional wrong word or "sound a like" substitutions may have occurred due to the inherent limitations of voice recognition software. Read the chart carefully and recognize, using context, where substitutions have occurred.

## 2023-08-25 NOTE — NURSING NOTE
DC instructions reviewed with patient. Patient demonstrates self administration of insulin injections, verbalizes understanding of dc instructions.

## 2023-08-25 NOTE — ASSESSMENT & PLAN NOTE
· Follows with [facility] endocrinology   · Takes testosterone injections 2x monthly - hold while inpatient

## 2023-08-25 NOTE — ASSESSMENT & PLAN NOTE
Lab Results   Component Value Date    HGBA1C 8.8 (H) 07/03/2023       Recent Labs     08/24/23  1621 08/24/23 2059 08/25/23  0743 08/25/23  1114   POCGLU 181* 199* 200* 247*       Blood Sugar Average: Last 72 hrs:  (P) 719.0406311328882458   · Uncontrolled evaluated by endocrinology yesterday today that endocrinology has increased his Lantus to 60 units at night and Humalog 20 units with meal he will be discharged with insulin we will have to provide education to the patient on the use.   · He has met with nutrition and provided education and outpatient referral.  Discussed with Dr. Megan Juares will discharge on the current doses all sent to the pharmacy and all the supplies as well and he was of received pens and needles

## 2023-08-25 NOTE — PLAN OF CARE
Problem: PAIN - ADULT  Goal: Verbalizes/displays adequate comfort level or baseline comfort level  Description: Interventions:  - Encourage patient to monitor pain and request assistance  - Assess pain using appropriate pain scale  - Administer analgesics based on type and severity of pain and evaluate response  - Implement non-pharmacological measures as appropriate and evaluate response  - Consider cultural and social influences on pain and pain management  - Notify physician/advanced practitioner if interventions unsuccessful or patient reports new pain  Outcome: Progressing     Problem: INFECTION - ADULT  Goal: Absence or prevention of progression during hospitalization  Description: INTERVENTIONS:  - Assess and monitor for signs and symptoms of infection  - Monitor lab/diagnostic results  - Monitor all insertion sites, i.e. indwelling lines, tubes, and drains  - Monitor endotracheal if appropriate and nasal secretions for changes in amount and color  - [place] appropriate cooling/warming therapies per order  - Administer medications as ordered  - Instruct and encourage patient and family to use good hand hygiene technique  - Identify and instruct in appropriate isolation precautions for identified infection/condition  Outcome: Progressing  Goal: Absence of fever/infection during neutropenic period  Description: INTERVENTIONS:  - Monitor WBC    Outcome: Progressing     Problem: SAFETY ADULT  Goal: Patient will remain free of falls  Description: INTERVENTIONS:  - Educate patient/family on patient safety including physical limitations  - Instruct patient to call for assistance with activity   - Consult OT/PT to assist with strengthening/mobility   - Keep Call bell within reach  - Keep bed low and locked with side rails adjusted as appropriate  - Keep care items and personal belongings within reach  - Initiate and maintain comfort rounds  - Make Fall Risk Sign visible to staff  - Apply yellow socks and bracelet for high fall risk patients  - Consider moving patient to room near nurses station  Outcome: Progressing  Goal: Maintain or return to baseline ADL function  Description: INTERVENTIONS:  -  Assess patient's ability to carry out ADLs; assess patient's baseline for ADL function and identify physical deficits which impact ability to perform ADLs (bathing, care of mouth/teeth, toileting, grooming, dressing, etc.)  - Assess/evaluate cause of self-care deficits   - Assess range of motion  - Assess patient's mobility; develop plan if impaired  - Assess patient's need for assistive devices and provide as appropriate  - Encourage maximum independence but intervene and supervise when necessary  - Involve family in performance of ADLs  - Assess for home care needs following discharge   - Consider OT consult to assist with ADL evaluation and planning for discharge  - Provide patient education as appropriate  Outcome: Progressing  Goal: Maintains/Returns to pre admission functional level  Description: INTERVENTIONS:  - Perform BMAT or MOVE assessment daily.   - Set and communicate daily mobility goal to care team and patient/family/caregiver.    - Collaborate with rehabilitation services on mobility goals if consulted  - Out of bed for toileting  - Record patient progress and toleration of activity level   Outcome: Progressing     Problem: DISCHARGE PLANNING  Goal: Discharge to home or other facility with appropriate resources  Description: INTERVENTIONS:  - Identify barriers to discharge w/patient and caregiver  - Arrange for needed discharge resources and transportation as appropriate  - Identify discharge learning needs (meds, wound care, etc.)  - Arrange for interpretive services to assist at discharge as needed  - Refer to Case Management Department for coordinating discharge planning if the patient needs post-hospital services based on physician/advanced practitioner order or complex needs related to functional status, cognitive ability, or social support system  Outcome: Progressing     Problem: Knowledge Deficit  Goal: Patient/family/caregiver demonstrates understanding of disease process, treatment plan, medications, and discharge instructions  Description: Complete learning assessment and assess knowledge base. Interventions:  - Provide teaching at level of understanding  - Provide teaching via preferred learning methods  Outcome: Progressing     Problem: Nutrition/Hydration-ADULT  Goal: Nutrient/Hydration intake appropriate for improving, restoring or maintaining nutritional needs  Description: Monitor and assess patient's nutrition/hydration status for malnutrition. Collaborate with interdisciplinary team and initiate plan and interventions as ordered. Monitor patient's weight and dietary intake as ordered or per policy. Utilize nutrition screening tool and intervene as necessary. Determine patient's food preferences and provide high-protein, high-caloric foods as appropriate.      INTERVENTIONS:  - Monitor oral intake, urinary output, labs, and treatment plans  - Assess nutrition and hydration status and recommend course of action  - Evaluate amount of meals eaten  - Assist patient with eating if necessary   - Allow adequate time for meals  - Recommend/ encourage appropriate diets, oral nutritional supplements, and vitamin/mineral supplements  - Order, calculate, and assess calorie counts as needed  - Recommend, monitor, and adjust tube feedings and TPN/PPN based on assessed needs  - Assess need for intravenous fluids  - Provide specific nutrition/hydration education as appropriate  - Include patient/family/caregiver in decisions related to nutrition  Outcome: Progressing

## 2023-08-25 NOTE — ASSESSMENT & PLAN NOTE
· Initially improved to 149 corrected  · As discussed with nephrology yesterday urine osmolality was done and was low suspected diabetes insipidus he did receive a DDAVP dose 8/23 with improvement - discussed with nephro concern for central DI needs to follow up with endo outpatient  Patient has already established and repeat labs in a week

## 2023-08-28 NOTE — UTILIZATION REVIEW
NOTIFICATION OF ADMISSION DISCHARGE   This is a Notification of Discharge from [address] [place] [address]. Please be advised that this patient has been discharge from our facility. Below you will find the admission and discharge date and time including the patient’s disposition. UTILIZATION REVIEW CONTACT:  Raphael Carroll  Utilization   Network Utilization Review Department  Phone: [phone] x carefully listen to the prompts. All voicemails are confidential.  Email: [email or web address]     ADMISSION INFORMATION  PRESENTATION DATE: 8/21/2023  9:25 PM  OBERVATION ADMISSION DATE:   INPATIENT ADMISSION DATE: 8/21/23 11:30 PM   DISCHARGE DATE: 8/25/2023  1:10 PM   DISPOSITION:Home/Self Care    IMPORTANT INFORMATION:  Send all requests for admission clinical reviews, approved or denied determinations and any other requests to dedicated fax number below belonging to the campus where the patient is receiving treatment.  List of dedicated fax numbers:  [place] DENIALS (Administrative/Medical Necessity) [phone] [facility] (Maternity/NICU/Pediatrics) [phone]   [facility] [phone]   [facility] [phone] [address] [phone]   [address] [place] [address] [phone]   [facility] [phone]   [address] [place] [address] [facility] [phone]   [address] [place] [address] [phone]   [address] [facility] [phone]   [address] [facility] [address] [facility] [phone]

## 2023-09-01 ENCOUNTER — OFFICE VISIT (OUTPATIENT)
Dept: ENDOCRINOLOGY | Facility: CLINIC | Age: 23
End: 2023-09-01
Payer: COMMERCIAL

## 2023-09-01 VITALS
BODY MASS INDEX: 34.05 KG/M2 | HEIGHT: 71 IN | WEIGHT: 243.2 LBS | OXYGEN SATURATION: 98 % | DIASTOLIC BLOOD PRESSURE: 98 MMHG | HEART RATE: 100 BPM | SYSTOLIC BLOOD PRESSURE: 118 MMHG

## 2023-09-01 DIAGNOSIS — R74.01 TRANSAMINITIS: ICD-10-CM

## 2023-09-01 DIAGNOSIS — E11.00 HYPEROSMOLAR HYPERGLYCEMIC STATE (HHS) (HCC): ICD-10-CM

## 2023-09-01 DIAGNOSIS — E23.0 HYPOGONADOTROPIC HYPOGONADISM (HCC): ICD-10-CM

## 2023-09-01 DIAGNOSIS — E11.9 TYPE 2 DIABETES MELLITUS WITHOUT COMPLICATION, WITHOUT LONG-TERM CURRENT USE OF INSULIN (HCC): ICD-10-CM

## 2023-09-01 DIAGNOSIS — E23.7: Primary | ICD-10-CM

## 2023-09-01 DIAGNOSIS — E87.0 HYPERNATREMIA: ICD-10-CM

## 2023-09-01 PROCEDURE — 99215 OFFICE O/P EST HI 40 MIN: CPT | Performed by: PHYSICIAN ASSISTANT

## 2023-09-01 NOTE — LETTER
September 5, 2023     Patient: Josy Parada  YOB: 2000  Date of Visit: 9/1/2023      To Whom it May Concern:      Josy Parada is under my professional care. He was hospitalized from 8/21/23 through 8/25/23. Osei Devi was seen in my office on 9/1/2023. Further follow up and medical care is required before he is medically stable to continue school. Tricia Rey may return to school on 9/11/23  without restrictions. If you have any questions or concerns, please don't hesitate to call.          Sincerely,          Sarika Pinon PA-C        CC: No Recipients

## 2023-09-01 NOTE — PROGRESS NOTES
Briana Ramirez 23 y.o. male MRN: [id number]    Encounter: [id number]      Assessment/Plan   Problem List Items Addressed This Visit        Endocrine    Type 2 diabetes mellitus ([address] [place] [address])       Lab Results   Component Value Date    HGBA1C 8.8 (H) 07/03/2023 ·   Recently worsening A1c over the past 2 years : 6.3 ---> 8.8%. resulting in recent hospitalization for HHS. · Now with metformin discontinued due to elevated LFTs, transitioned to basal-bolus insulin regimen as follows: Lantus 60 units daily, Novolog 20 units TID AC. · BG now reasonably well controlled with no BG < 100 or > 250 recorded since discharge. · Would benefit from CGM use due to multiple daily injections of insulin. Will review CGM report in 1 week ( notification set). Plan to make possible insulin dosing adjustments following more BG data collection. · Will refer to diabetes education - CDE for MNT. Agreeable to referral - orders placed today. · Letter provided today - school excuse. Allowing 2 weeks of time off from college while recovering from hospitalization, completing workup. Relevant Orders    Ambulatory referral to Diabetic Education    Hyperosmolar hyperglycemic state (HHS) ([address] [place] [address])       Lab Results   Component Value Date    HGBA1C 8.8 (H) 07/03/2023 ·   resulting in recent hospitalization, now resolved. Relevant Orders    Testosterone, free, total Lab Collect    Hypogonadotropic hypogonadism (HCC)     · Stable to continue IM testosterone at current dosing - 100mg every 2 weeks. · Will obtain free, total testosterone levels. · Recent CBC - Hgb 14.4, Hct 49.8. Disease of anterior pituitary ([address] [place] [address]) - Primary     · History of sphenoid encephalocele, with surveillance pituitary MRI obtained annually. Stable in appearance recently. · Will obtain pituitary panel - AM cortisol, IGF-1, ACTH, FSH, Prolactin, TSH, T4, testosterone.          Relevant Orders    Comprehensive metabolic panel Lab Collect    Testosterone, free, total Lab Collect    MRI brain pituitary wo and w contrast    Insulin-like growth factor 1 (IGF-1)    ACTH    Follicle stimulating hormone    Prolactin    TSH, 3rd generation    T4, free    Cortisol Level,7-9 AM Specimen       Other    Transaminitis    Relevant Orders    Comprehensive metabolic panel Lab Collect    Hypernatremia     · Recently suspected of having diabetes insipidus on the basis of increasing urine osmolality following DDAVP administration in hospital setting. · Mild, asymptomatic hyponatremia on recent outpatient labs - 146mg/dL. · Patient does not carry a burden of chronic symptoms, has returned to his baseline reports of polydipsia. Urination is 1-2 times nightly, nocturia not bothersome to patient. Urine is slightly yellow in color, appears euvolemic on exam today. · Pituitary MRI ordered as surveillance imaging for known sphenoid encephalocele. · Will repeat CMP to start. · Consider follow up water deprivation test or hypertonic saline test.          I have spent a total time of 60 minutes on 09/05/23 in caring for this patient including Diagnostic results, Instructions for management, Patient and family education, Impressions, Counseling / Coordination of care, Documenting in the medical record, Reviewing / ordering tests, medicine, procedures  , Obtaining or reviewing history   and Communicating with other healthcare professionals . CC: Diabetes    History of Present Illness     HPI:  Ramu Petersen is a 26year old male who is here for follow up from hospital stay for hyperosmolar, hyperglycemic state and possible diabetes insipidus. 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  He also carries a diagnosis of a lumbar Schwannoma s/p resection, as well as a sphenoid encephalocele. Dg Hdz was recently admitted to the hospital 8/21 - 8/25 for hyperosmolar hyperglycemic state where he was treated with IVF hydration, IV insulin infusion, then transitioned to basal-bolus insulin regimen on discharge. He was previously on metformin as monotherapy, but this was discontinued during admission. During his hospitalization, he was also noted to be hypernatremic, which lead to a workup for diabetes insipidus. Following administration of DDAVP he had an increase in urine osmolality. The week before his hospitalization he reports feeling very tired with polydipsia, polyuria, poor appetite, blurred vision, feeling "shaky" with tremor. Since discharge, all symptoms have returned to baseline. He acknowledges that he has always tended to drink large quantities of water, it's his baseline habit, but this is not bothersome to him. No nausea, vomiting, diarrhea, urinary frequency or urgency. Denies syncope, dizziness, seizure activity. Dg Hdz previously followed with [facility] for endocrinology (Dr. Omkar Davis), but has been having difficulty coordinating appointments due to provider availability, distance of drive. He is looking to establish care closer to home. He typically has been following with endocrinology annually, with annual pituitary MRI ordered to assess sphenoid encephalocele     For growth hormone deficiency, he was previously on growth hormone replacement from about 12years old until 12-13 years old. He was later discovered to have testosterone deficiency, started IM testosterone replacement since age 27. He is currently taking testosterone cypionate 100mg every 2 weeks, but recently told to hold due to elevated LFTs on recent bloodwork. Regarding diabetes, he was diagnosed with type 2 DM 2-3 years ago, prediabetic for several years prior to this. Only on metformin 500mg BID as monotherapy. He just recently started recording blood glucose levels via glucometer.  BG has varied between 118 - 240. Fasting BG has ranged between 160-210. BG log left at home, details limited. He had not been previously hospitalized for diabetes until this current hospital stay. Current DM medication regimen:  Humalog 20 units TID AC  Lantus 60 units at bedtime         Review of Systems   Constitutional: Negative for chills and fever. HENT: Negative for ear pain and sore throat. Eyes: Negative for pain and visual disturbance. Respiratory: Negative for cough and shortness of breath. Cardiovascular: Negative for chest pain and palpitations. Gastrointestinal: Negative for abdominal pain and vomiting. Endocrine: Negative for polydipsia and polyuria. Genitourinary: Negative for dysuria and hematuria. Musculoskeletal: Negative for arthralgias and back pain. Skin: Negative for color change and rash. Neurological: Negative for dizziness, seizures, syncope and headaches. All other systems reviewed and are negative. Historical Information   Past Medical History:   Diagnosis Date   • Diabetes mellitus ([address])      Past Surgical History:   Procedure Laterality Date   • BACK SURGERY     • CT NEEDLE BIOPSY MEDIASTINUM  4/17/2017     Social History   Social History     Substance and Sexual Activity   Alcohol Use Not Currently     Social History     Substance and Sexual Activity   Drug Use Never     Social History     Tobacco Use   Smoking Status Never   Smokeless Tobacco Never     Family History: No family history on file. Meds/Allergies   Current Outpatient Medications   Medication Sig Dispense Refill   • Alcohol Swabs 70 % PADS May substitute brand based on insurance coverage. Check glucose ACHS. 200 each 0   • Blood Glucose Monitoring Suppl (OneTouch Verio Reflect) w/Device KIT May substitute brand based on insurance coverage. Check glucose ACHS. 1 kit 0   • glucose blood (OneTouch Verio) test strip May substitute brand based on insurance coverage. Check glucose ACHS.  200 each 0   • Insulin Glargine Solostar (Lantus SoloStar) 100 UNIT/ML SOPN Inject 0.6 mL (60 Units total) under the skin daily at bedtime 15 mL 2   • insulin lispro (HumaLOG KwikPen) 100 units/mL injection pen Inject 20 Units under the skin 3 (three) times a day with meals 15 mL 0   • Insulin Pen Needle (BD Pen Needle Danette 2nd Gen) 32G X 4 MM MISC For use with insulin pen. Pharmacy may dispense brand covered by insurance. 100 each 0   • Insulin Pen Needle (BD Pen Needle Danette 2nd Gen) 32G X 4 MM MISC For use with insulin pen. Pharmacy may dispense brand covered by insurance. 100 each 0   • OneTouch Delica Lancets 94F MISC May substitute brand based on insurance coverage. Check glucose ACHS. 200 each 0   • Testosterone Cypionate 100 MG/ML SOLN 100 mg by Intramuscular (multi inj) route every 14 (fourteen) days       No current facility-administered medications for this visit. No Known Allergies    Objective   Vitals: Blood pressure 118/98, pulse 100, height 5' 11" (1.803 m), weight 110 kg (243 lb 3.2 oz), SpO2 98 %. Physical Exam  Vitals reviewed. Constitutional:       General: He is not in acute distress. HENT:      Head: Normocephalic. Mouth/Throat:      Mouth: Mucous membranes are moist.   Eyes:      General: Visual field deficit (decreased peripheral vision on gross exam.) present. Extraocular Movements:      Right eye: Nystagmus present. Left eye: Nystagmus present. Cardiovascular:      Rate and Rhythm: Normal rate and regular rhythm. Heart sounds: Normal heart sounds. Pulmonary:      Effort: Pulmonary effort is normal.      Breath sounds: No wheezing. Musculoskeletal:      Right lower leg: No edema. Left lower leg: No edema. Skin:     General: Skin is warm and dry. Neurological:      General: No focal deficit present. Mental Status: He is alert and oriented to person, place, and time. Mental status is at baseline. Cranial Nerves: No cranial nerve deficit.    Psychiatric: Mood and Affect: Mood normal.         The history was obtained from the review of the chart, patient. Lab Results:   Lab Results   Component Value Date/Time    Hemoglobin A1C 8.8 (H) 07/03/2023 09:47 AM    WBC 9.11 08/22/2023 05:19 AM    WBC 9.99 08/21/2023 09:47 PM    Hemoglobin 14.4 08/22/2023 05:19 AM    Hemoglobin 16.2 08/21/2023 09:47 PM    Hematocrit 42.9 08/22/2023 05:19 AM    Hematocrit 47.2 08/21/2023 09:47 PM    MCV 93 08/22/2023 05:19 AM    MCV 93 08/21/2023 09:47 PM    Platelets 063 83/36/2945 05:19 AM    Platelets 185 12/68/5548 09:47 PM    BUN 6 08/25/2023 05:42 AM    BUN 7 08/24/2023 02:02 PM    BUN 7 08/24/2023 04:49 AM    Potassium 3.2 (L) 08/25/2023 05:42 AM    Potassium 3.8 08/24/2023 02:02 PM    Potassium 3.6 08/24/2023 04:49 AM    Chloride 106 08/25/2023 05:42 AM    Chloride 109 (H) 08/24/2023 02:02 PM    Chloride 113 (H) 08/24/2023 04:49 AM    CO2 26 08/25/2023 05:42 AM    CO2 23 08/24/2023 02:02 PM    CO2 27 08/24/2023 04:49 AM    Creatinine 0.60 08/25/2023 05:42 AM    Creatinine 0.77 08/24/2023 02:02 PM    Creatinine 0.74 08/24/2023 04:49 AM     (H) 08/25/2023 05:42 AM     (H) 08/24/2023 04:49 AM    AST 49 (H) 08/22/2023 05:19 AM     (H) 08/25/2023 05:42 AM     (H) 08/24/2023 04:49 AM    ALT 65 (H) 08/22/2023 05:19 AM    Total Protein 5.9 (L) 08/25/2023 05:42 AM    Total Protein 5.9 (L) 08/24/2023 04:49 AM    Total Protein 6.6 08/22/2023 05:19 AM    Albumin 3.3 (L) 08/25/2023 05:42 AM    Albumin 3.4 (L) 08/24/2023 04:49 AM    Albumin 3.8 08/22/2023 05:19 AM           Imaging Studies: I have personally reviewed pertinent reports. Exam Date Time Procedure Performing Provider Status   8/3/22 10:17 AM MRI Brain w/ + w/o Contrast Julio Echeverria;   Final   Notes:    (MRI Brain w/ + w/o Contrast) Reason For Exam: sphenoid encephalocele    MRI Brain w/ + w/o Contrast      EXAMINATION:  MRI OF THE BRAIN WITH AND WITHOUT CONTRAST    CLINICAL HISTORY:   Q01.9: Encephalocele, unspecified;   sphenoid encephalocele    COMPARISON:   7/5/2021, 2/3/2006    TECHNIQUE:  Multiplanar, multisequence MR of the brain was performed without and with intravenous contrast.    CONTRAST:  Gadavist, 12.00    FINDINGS:   Cerebral parenchyma: There is no restricted diffusion to suggest acute or early subacute infarct. There is no hemorrhage. Brain volume is normal for age. The sphenoid encephalocele measures 2.0 x 2.6 cm in transverse and craniocaudal imaging, essentially unchanged from 2021. There may be a very thin rim of pituitary tissue against the posterior margin of this encephalocele. CSF flow artifact is noted extending from the ventricles through the meningocele. The infundibulum is poorly visualized and again likely thinned/stretched. No definite posterior pituitary tissue is visualized either in normal or ectopic location. No suprasellar extension. The optic chiasm is displaced inferiorly by prominent anterior third ventricle. Extra-axial spaces: Normal   Ventricles: Normal   Mass effect: None   Posterior Fossa: cerebellar tonsils are normal in position and configuration   Vascular system: major intracranial flow voids are present. Calvarium: Normal   Visualized paranasal sinuses/mastoids: clear     IMPRESSION:  1. Sphenoid encephalocele is stable compared to 2021, although as noted previously it has slowly increased since 2006. 2. Unchanged appearance of the pituitary gland, infundibulum, optic chiasm and optic nerves. 3. No acute intracranial findings. [place] Act 112: This study does not meet the requirements of [place] Act 112. Final     Dictated by:MD Denver, Gilbert Madison  Dictated DT/TM:08/03/2022 11:12  Signed by:MD Denver, Vicki Hong (Electronic Signature):08/03/2022 11:11          Portions of the record may have been created with voice recognition software.  Occasional wrong word or "sound a like" substitutions may have occurred due to the inherent limitations of voice recognition software. Read the chart carefully and recognize, using context, where substitutions have occurred.

## 2023-09-05 ENCOUNTER — OFFICE VISIT (OUTPATIENT)
Dept: ENDOCRINOLOGY | Facility: CLINIC | Age: 23
End: 2023-09-05

## 2023-09-05 ENCOUNTER — TELEPHONE (OUTPATIENT)
Dept: ENDOCRINOLOGY | Facility: CLINIC | Age: 23
End: 2023-09-05

## 2023-09-05 DIAGNOSIS — E10.9 TYPE 1 DIABETES MELLITUS WITHOUT COMPLICATION (HCC): Primary | ICD-10-CM

## 2023-09-05 PROBLEM — E23.7 DISEASE OF ANTERIOR PITUITARY (HCC): Status: ACTIVE | Noted: 2023-09-05

## 2023-09-05 PROBLEM — E23.0 HYPOGONADOTROPIC HYPOGONADISM (HCC): Status: ACTIVE | Noted: 2022-07-25

## 2023-09-05 NOTE — ASSESSMENT & PLAN NOTE
Lab Results   Component Value Date    HGBA1C 8.8 (H) 07/03/2023   · resulting in recent hospitalization, now resolved.

## 2023-09-05 NOTE — TELEPHONE ENCOUNTER
So patients mom called and said the [place] we gave him the needle was not there, I told them to come in for another one, also his back has been hurting him all weekend so bad mom said they were going to take him to ER she wants to know if this is from insulin start, or could it be his testosterone injection was for Tues but they are stopping that so he missed his injection could it be from that.  She said there was no lifting or anything that would strain his back

## 2023-09-05 NOTE — TELEPHONE ENCOUNTER
Placed Irina 3 sensor on R arm in office - tolerated well. Linked sensor to Kristan, clinic share code entered in [id number]. Will plan to follow up CGM report in 1 week. Confirmed no dysuria, hematuria. Back pain worse with movement, although no injury reported. Advised ok to start testosterone injections. This was held after hospitalization due to elevated LFTs.

## 2023-09-05 NOTE — ASSESSMENT & PLAN NOTE
· Recently suspected of having diabetes insipidus on the basis of increasing urine osmolality following DDAVP administration in hospital setting. · Mild, asymptomatic hyponatremia on recent outpatient labs - 146mg/dL. · Patient does not carry a burden of chronic symptoms, has returned to his baseline reports of polydipsia. Urination is 1-2 times nightly, nocturia not bothersome to patient. Urine is slightly yellow in color, appears euvolemic on exam today. · Pituitary MRI ordered as surveillance imaging for known sphenoid encephalocele. · Will repeat CMP to start.    · Consider follow up water deprivation test or hypertonic saline test.

## 2023-09-05 NOTE — ASSESSMENT & PLAN NOTE
· History of sphenoid encephalocele, with surveillance pituitary MRI obtained annually. Stable in appearance recently. · Will obtain pituitary panel - AM cortisol, IGF-1, ACTH, FSH, Prolactin, TSH, T4, testosterone.

## 2023-09-05 NOTE — ASSESSMENT & PLAN NOTE
· Stable to continue IM testosterone at current dosing - 100mg every 2 weeks. · Will obtain free, total testosterone levels. · Recent CBC - Hgb 14.4, Hct 49.8.

## 2023-09-05 NOTE — PROGRESS NOTES
Patient just stopped by office today for assistance with Vetr 3 CGM application due to malfunction. Also provided Rx for MRI brain, printed orders for Pituitary lab panel, follow up appointment provided with Dr. Aime Lopez for 1 month. No charge necessary.

## 2023-09-05 NOTE — ASSESSMENT & PLAN NOTE
Lab Results   Component Value Date    HGBA1C 8.8 (H) 07/03/2023   · Recently worsening A1c over the past 2 years : 6.3 ---> 8.8%. resulting in recent hospitalization for HHS. · Now with metformin discontinued due to elevated LFTs, transitioned to basal-bolus insulin regimen as follows: Lantus 60 units daily, Novolog 20 units TID AC. · BG now reasonably well controlled with no BG < 100 or > 250 recorded since discharge. · Would benefit from CGM use due to multiple daily injections of insulin. Will review CGM report in 1 week ( notification set). Plan to make possible insulin dosing adjustments following more BG data collection. · Will refer to diabetes education - CDE for MNT. Agreeable to referral - orders placed today. · Letter provided today - school excuse. Allowing 2 weeks of time off from college while recovering from hospitalization, completing workup.

## 2023-09-07 ENCOUNTER — APPOINTMENT (OUTPATIENT)
Dept: LAB | Facility: CLINIC | Age: 23
End: 2023-09-07
Payer: COMMERCIAL

## 2023-09-07 DIAGNOSIS — E11.00 HYPEROSMOLAR HYPERGLYCEMIC STATE (HHS) (HCC): ICD-10-CM

## 2023-09-07 DIAGNOSIS — E23.7: ICD-10-CM

## 2023-09-07 DIAGNOSIS — E87.0 HYPERNATREMIA: ICD-10-CM

## 2023-09-07 DIAGNOSIS — R74.01 TRANSAMINITIS: ICD-10-CM

## 2023-09-07 LAB
ALBUMIN SERPL BCP-MCNC: 3.7 G/DL (ref 3.5–5)
ALP SERPL-CCNC: 78 U/L (ref 34–104)
ALT SERPL W P-5'-P-CCNC: 34 U/L (ref 7–52)
ANION GAP SERPL CALCULATED.3IONS-SCNC: 9 MMOL/L
AST SERPL W P-5'-P-CCNC: 43 U/L (ref 13–39)
BILIRUB DIRECT SERPL-MCNC: 0.08 MG/DL (ref 0–0.2)
BILIRUB SERPL-MCNC: 0.6 MG/DL (ref 0.2–1)
BUN SERPL-MCNC: 8 MG/DL (ref 5–25)
CALCIUM SERPL-MCNC: 9.1 MG/DL (ref 8.4–10.2)
CHLORIDE SERPL-SCNC: 107 MMOL/L (ref 96–108)
CO2 SERPL-SCNC: 30 MMOL/L (ref 21–32)
CORTIS AM PEAK SERPL-MCNC: 13.7 UG/DL (ref 6.7–22.6)
CREAT SERPL-MCNC: 0.75 MG/DL (ref 0.6–1.3)
FSH SERPL-ACNC: 0.2 MIU/ML
GFR SERPL CREATININE-BSD FRML MDRD: 131 ML/MIN/1.73SQ M
GLUCOSE P FAST SERPL-MCNC: 105 MG/DL (ref 65–99)
MAGNESIUM SERPL-MCNC: 2.2 MG/DL (ref 1.9–2.7)
POTASSIUM SERPL-SCNC: 4.3 MMOL/L (ref 3.5–5.3)
PROLACTIN SERPL-MCNC: 5.71 NG/ML
PROT SERPL-MCNC: 7 G/DL (ref 6.4–8.4)
SODIUM SERPL-SCNC: 146 MMOL/L (ref 135–147)

## 2023-09-07 PROCEDURE — 84146 ASSAY OF PROLACTIN: CPT

## 2023-09-07 PROCEDURE — 80053 COMPREHEN METABOLIC PANEL: CPT

## 2023-09-07 PROCEDURE — 82533 TOTAL CORTISOL: CPT

## 2023-09-07 PROCEDURE — 84402 ASSAY OF FREE TESTOSTERONE: CPT

## 2023-09-07 PROCEDURE — 83001 ASSAY OF GONADOTROPIN (FSH): CPT

## 2023-09-07 PROCEDURE — 82024 ASSAY OF ACTH: CPT

## 2023-09-07 PROCEDURE — 36415 COLL VENOUS BLD VENIPUNCTURE: CPT

## 2023-09-07 PROCEDURE — 82248 BILIRUBIN DIRECT: CPT

## 2023-09-07 PROCEDURE — 84403 ASSAY OF TOTAL TESTOSTERONE: CPT

## 2023-09-07 PROCEDURE — 84305 ASSAY OF SOMATOMEDIN: CPT

## 2023-09-07 PROCEDURE — 83735 ASSAY OF MAGNESIUM: CPT

## 2023-09-08 ENCOUNTER — TELEPHONE (OUTPATIENT)
Dept: ENDOCRINOLOGY | Facility: CLINIC | Age: 23
End: 2023-09-08

## 2023-09-08 LAB
ACTH PLAS-MCNC: 28.9 PG/ML (ref 7.2–63.3)
TESTOST FREE SERPL-MCNC: 3.6 PG/ML (ref 9.3–26.5)
TESTOST SERPL-MCNC: 85 NG/DL (ref 264–916)

## 2023-09-08 NOTE — TELEPHONE ENCOUNTER
Patients mom called and said Nikki Varela fell off as he went swimming, so she wants to know what she should do now

## 2023-09-08 NOTE — TELEPHONE ENCOUNTER
Ok to swim with sensor on in future, but would limit to 30 minutes at a time and no deeper submersion of than 3 feet (manufacturers instruction). If sensor adhesive effectiveness  becomes a recurrent issue, They do have adhesive patches available for purchase to help keep it in place. In the meantime, short term, recommend go back to checking blood sugar via glucometer 4 times daily (before each meal and at bedtime). We do have another Irina 2 sample here if he would like to come  (on my desk). Lastly, does he prefer using CGM for blood glucose monitoring over fingersticks long term? If so, please send a formal prescription in through [employer].

## 2023-09-09 LAB — IGF-I SERPL-MCNC: 20 NG/ML (ref 109–353)

## 2023-09-13 ENCOUNTER — TELEPHONE (OUTPATIENT)
Dept: ENDOCRINOLOGY | Facility: CLINIC | Age: 23
End: 2023-09-13

## 2023-09-13 DIAGNOSIS — Z79.4 TYPE 2 DIABETES MELLITUS WITH HYPOGLYCEMIA WITHOUT COMA, WITH LONG-TERM CURRENT USE OF INSULIN (HCC): ICD-10-CM

## 2023-09-13 DIAGNOSIS — Z79.4 TYPE 2 DIABETES MELLITUS WITHOUT COMPLICATION, WITH LONG-TERM CURRENT USE OF INSULIN (HCC): ICD-10-CM

## 2023-09-13 DIAGNOSIS — E11.649 TYPE 2 DIABETES MELLITUS WITH HYPOGLYCEMIA WITHOUT COMA, WITH LONG-TERM CURRENT USE OF INSULIN (HCC): ICD-10-CM

## 2023-09-13 DIAGNOSIS — E11.9 TYPE 2 DIABETES MELLITUS WITHOUT COMPLICATION, WITH LONG-TERM CURRENT USE OF INSULIN (HCC): ICD-10-CM

## 2023-09-13 RX ORDER — INSULIN GLARGINE 100 [IU]/ML
20 INJECTION, SOLUTION SUBCUTANEOUS
Qty: 15 ML | Refills: 2 | Status: SHIPPED
Start: 2023-09-13

## 2023-09-13 RX ORDER — BLOOD-GLUCOSE SENSOR
1 EACH MISCELLANEOUS
Qty: 3 EACH | Refills: 11 | Status: SHIPPED | OUTPATIENT
Start: 2023-09-13

## 2023-09-13 RX ORDER — BLOOD-GLUCOSE SENSOR
1 EACH MISCELLANEOUS
Qty: 3 EACH | Refills: 11 | Status: SHIPPED | OUTPATIENT
Start: 2023-09-13 | End: 2023-09-13

## 2023-09-13 RX ORDER — INSULIN LISPRO 100 [IU]/ML
10 INJECTION, SOLUTION INTRAVENOUS; SUBCUTANEOUS
Qty: 15 ML | Refills: 0 | Status: SHIPPED
Start: 2023-09-13 | End: 2023-09-19 | Stop reason: SDUPTHER

## 2023-09-13 NOTE — TELEPHONE ENCOUNTER
Spoke with mom via phone. Mouna Newton is experiencing hypoglycemia over the past few days since returning to college. Low BG was 54 in early AM. He also recorded a few values in the 60s - 70s that day before meals. He has held his basal insulin the past few days, but continues to take mealtime insulin coverage as prescribed. He is maybe getting in slightly more exercise with walking around campus, but not significant. He is eating at regular intervals, no appetite change or illness. He is currently checking BG via fingerstick due to sample CGM falling off. Enjoyed CGM technology though and would like to continue. Recommend dose decrease in both basal and bolus regimen - reduce Lantus from 60 units daily to 20 units daily, reduce Novolog from 20 units TID AC to 10 units TID AC. Discussed goal BG is  fasting,  throughout day otherwise. Mom or patient to call with concerning numbers, and will follow up in person with Dr. Tony Gonsalez October 5th. Mom is also requesting letter faxed to [facility] indicating medications he is taking, indication. Will provide.

## 2023-09-13 NOTE — LETTER
September 13, 2023     Patient: Becky Serna  YOB: 2000  Date of Visit: 9/13/2023      To Whom it May Concern:    Becky Serna is under my professional care. Judi Severin was seen most recently in my office on 9/13/2023. He is currently being treated for type 2 diabetes as well as hypogonadotrophic hypogonadism. Relevant chronic medications include:   Novolog KwikPen 10 units TID AC, or as directed (self administered)  Lantus solostar pen 20 units daily, or as directed (self administered)  Testosterone Cypionate 100mg/mL solution, inject 100mg IM every 14 days (to be administered by medical staff). If further information is needed, please let us know. We will also do our best to communicate any changes in dosing in current medications or new medications.        Sincerely,          Kylah Sommer PA-C  [facility], [place]

## 2023-09-13 NOTE — TELEPHONE ENCOUNTER
Mom called and said that her son has been having low blood sugars, Monday was 54 breakfast, lunch 66, dinner 75 bedtime 82 didn't take long acting insulin that night and woke up it was 91. She also needs a note for his school stating all the medications he is taking and there doses and when he takes them.  Need to fax to school, Health and [address] [place] [address] at [phone]  Also the [place] you put on fell off the next day

## 2023-09-19 DIAGNOSIS — Z79.4 TYPE 2 DIABETES MELLITUS WITH HYPOGLYCEMIA WITHOUT COMA, WITH LONG-TERM CURRENT USE OF INSULIN (HCC): ICD-10-CM

## 2023-09-19 DIAGNOSIS — E11.649 TYPE 2 DIABETES MELLITUS WITH HYPOGLYCEMIA WITHOUT COMA, WITH LONG-TERM CURRENT USE OF INSULIN (HCC): ICD-10-CM

## 2023-09-19 DIAGNOSIS — E11.9 TYPE 2 DIABETES MELLITUS (HCC): ICD-10-CM

## 2023-09-19 RX ORDER — BLOOD SUGAR DIAGNOSTIC
1 STRIP MISCELLANEOUS 4 TIMES DAILY
Qty: 400 EACH | Refills: 0 | Status: SHIPPED | OUTPATIENT
Start: 2023-09-19

## 2023-09-19 RX ORDER — PEN NEEDLE, DIABETIC 32GX 5/32"
NEEDLE, DISPOSABLE MISCELLANEOUS
Qty: 400 EACH | Refills: 1 | Status: SHIPPED | OUTPATIENT
Start: 2023-09-19

## 2023-09-19 RX ORDER — PEN NEEDLE, DIABETIC 32GX 5/32"
NEEDLE, DISPOSABLE MISCELLANEOUS
Qty: 100 EACH | Refills: 0 | Status: SHIPPED | OUTPATIENT
Start: 2023-09-19

## 2023-09-19 RX ORDER — INSULIN LISPRO 100 [IU]/ML
10 INJECTION, SOLUTION INTRAVENOUS; SUBCUTANEOUS
Qty: 15 ML | Refills: 0 | Status: SHIPPED | OUTPATIENT
Start: 2023-09-19

## 2023-09-20 ENCOUNTER — HOSPITAL ENCOUNTER (OUTPATIENT)
Dept: MRI IMAGING | Facility: HOSPITAL | Age: 23
Discharge: HOME | End: 2023-09-20
Payer: COMMERCIAL

## 2023-09-20 DIAGNOSIS — E23.7: ICD-10-CM

## 2023-09-20 PROCEDURE — G1004 CDSM NDSC: HCPCS

## 2023-09-20 PROCEDURE — A9585 GADOBUTROL INJECTION: HCPCS | Performed by: PHYSICIAN ASSISTANT

## 2023-09-20 PROCEDURE — 70553 MRI BRAIN STEM W/O & W/DYE: CPT

## 2023-09-20 RX ORDER — GADOBUTROL 604.72 MG/ML
10 INJECTION INTRAVENOUS
Status: COMPLETED | OUTPATIENT
Start: 2023-09-20 | End: 2023-09-20

## 2023-09-20 RX ADMIN — GADOBUTROL 10 ML: 604.72 INJECTION INTRAVENOUS at 08:31

## 2023-09-21 ENCOUNTER — OFFICE VISIT (OUTPATIENT)
Dept: ENDOCRINOLOGY | Facility: OTHER | Age: 23
End: 2023-09-21
Payer: COMMERCIAL

## 2023-09-21 VITALS — BODY MASS INDEX: 34.84 KG/M2 | WEIGHT: 249.8 LBS

## 2023-09-21 DIAGNOSIS — E11.9 TYPE 2 DIABETES MELLITUS WITHOUT COMPLICATION, WITHOUT LONG-TERM CURRENT USE OF INSULIN (HCC): Primary | ICD-10-CM

## 2023-09-21 PROCEDURE — 97802 MEDICAL NUTRITION INDIV IN: CPT

## 2023-09-21 NOTE — PROGRESS NOTES
Medical Nutrition Therapy        Assessment    Visit Type: Initial visit  Chief complaint/Medical Diagnosis/reason for visit E11.9    HPI Dany Diego was seen in person for the initial MNT appointment. Patient was pleasant and willing to share assessment information. BG levels are checked more than 4x/day before meals and are usually  mg/dL. Low BG reported to have occurred prior to diagnosis. Educated on proper low BG treatment and provided handouts for reference. Patient needed assistant with placing his [employer] that he brought to the session. Guided him on CGM usage, which was not included in the billable hours. Patient does take diabetes medication as prescribed. Patient reported no current exercise regimen. Explained how exercise lowers BG levels by creating more demand for glucose in the muscle cells. Will discussed addition of exercise to daily routine at the follow-up session. Dany Diego eats most of his meals at college. He has limited choices but is able to make some food substitutions to improve his diabetes management (ex: choose 1% milk instead of 2% to reduce saturated fat intake.)    Problems identified in food recall include meal timing, inconsistent carbohydrate intake, high-saturated choices (dairy, breakfast meats), limited non-starchy vegetables and whole grains, some imbalanced meals. Consumption of carbohydrates ranges from 12 to 48 grams per meal. Explained basic pathophysiology of diabetes and impact of diet on blood glucose levels and disease complications. Explained how sodium influences volume retention, blood pressure, and complications for heart disease, stroke, and CKD. Provided patient with a 2055 calorie meal plan to assist with consistency, balance and portion control. Encouraged the consumption of regular meals at regular times. Advised patient to keep carbohydrate intake to 45-60 grams per meal and 15-20 per snack to assist with glycemic control.   Suggested keeping protein intake to 10 ounces a day and fat to 5 servings daily to assist with lipid management and calorie control. Portion booklet and food labels were used to teach basic carbohydrate counting. Patient agreed to keep daily food logs and return them in 2 months for assessment. RD will remain available for further dietary questions/concerns.      Ht Readings from Last 1 Encounters:   09/01/23 5' 11" (1.803 m)     Wt Readings from Last 3 Encounters:   09/21/23 113 kg (249 lb 12.8 oz)   09/01/23 110 kg (243 lb 3.2 oz)   08/25/23 111 kg (245 lb 11.2 oz)     Weight Change: No    Barriers to Learning: vision    Do you follow any special diet presently?: No  Who shops: patient  Who cooks: patient     At [facility] has meal plan    Food Log: Completed via the method of food recall    Wakes up Tues & Thursday 6:30 am; MW 8:30 am    Breakfast:MWF 9-9:30 am Tues/Thurs 7-7:30 am; scrambled eggs sometimes 1 slice toast (white or wheat) w/ butter with 2 peralta or 1 sausage (pork) and sometimes a small cup of fruit (medley or blueberries or strawberries) with 12 oz milk (2%)  Morning Snack:none  Lunch:12:30-1:30 pm; grilled chicken w/ 2 TBSP with cucumber slices with an apple (small) with SF vitamin water OR roasted turkey breast with 1/2 c corn or carrots or cucumber or mushroom w/ fruit and SF drink  Afternoon Snack:  Tues/Thurs 4-4:30 pm 1/4 c cashews or bell peppers or sf jello  Dinner:6:30 pm; grilled chicken/turkey/pork with veg and drink and sometimes fruit with dinner OR chicken (baked or grilled; bbq or w/ provolone cheese and mushrooms) with 1/4 c veg (carrots, corn, cauliflower, asparagus) and sometimes 1/2 c pasta (whole grain, protein plus) w/ parmesan cheese  Evening Snack: 9 pm; SF jello with water  Beverages: water, milk, SF vitamin  Eating out/Take out:none  Exercise none    Calorie needs 2055 kcals/day Carbs: 45-60 g/meal, 15-20 g/snack     Fat: 5 servings/day    Protein:10 ounces/day    Nutrition Diagnosis:  Food and nutrition related knowledge deficit  related to Lack of prior exposure to accurate nutrition related information as evidenced by Verbalizes inaccurate or incomplete information    Intervention: plate method, increased fiber intake, label reading, carbohydrate counting, increased plant based foods, meal timing, meal planning, exercise guidelines and food diary     Treatment Goals: Patient understands education and recommendations, Patient will monitor food intake daily with tracker, Patient will consume 3 meals a day, Patient will increase their intake of plant based foods, Patient will count carbohydrates, Patient will exercise and Patient will monitor blood glucose    Monitoring and evaluation:    Term code indicator  FH 1.3.2 Food Intake Criteria: Eat 3 meals per day, 4-5 hours apart. No meal skipping. Eat your snack 2-3 hours away from your meals. Term code indicator  FH 1.6.3 Carbohydrate Intake Criteria: Eat 45-60 g carb/ meal and 15-20 g / snack  Term code indicator  CH 2.2 Treatments/Therapy/Alternative Medicine Criteria: Initiate regular exercise to build to at least 30 minutes per day or 150 minutes of moderate exercise per week, and 2 days per week of resistance/strength training, pending physician approval.     Materials Provided: Portion book, 3-day food log    Patient’s Response to Instruction:  Comprehensiongood  Motivationgood  Expected Compliancegood    Begin Time: 2:21 pm  End Time: 3:34 pm  Referring Provider: Luke Peñaloza PA-C    Thank you for coming to the [address] [place] [address]  for education today. Please feel free to call with any questions or concerns.     Floyd Lerner, [address] [address]   [phone]

## 2023-09-21 NOTE — PATIENT INSTRUCTIONS
Eat 3 meals per day, 4-5 hours apart. No meal skipping. Eat your snack 2-3 hours away from your meals.      Eat 45-60 grams of carbohydrate per meal and 15-20 grams per snack     Initiate regular exercise to build to at least 30 minutes per day or 150 minutes of moderate exercise per week, and 2 days per week of resistance/strength training, pending physician approval.     Complete 3-day food log and return completed log at the follow up appointment

## 2023-09-22 ENCOUNTER — APPOINTMENT (OUTPATIENT)
Age: 23
End: 2023-09-22

## 2023-10-05 ENCOUNTER — OFFICE VISIT (OUTPATIENT)
Dept: ENDOCRINOLOGY | Facility: CLINIC | Age: 23
End: 2023-10-05
Payer: COMMERCIAL

## 2023-10-05 VITALS
OXYGEN SATURATION: 98 % | BODY MASS INDEX: 35.22 KG/M2 | HEART RATE: 78 BPM | WEIGHT: 251.6 LBS | DIASTOLIC BLOOD PRESSURE: 72 MMHG | HEIGHT: 71 IN | SYSTOLIC BLOOD PRESSURE: 98 MMHG

## 2023-10-05 DIAGNOSIS — Z79.4 TYPE 2 DIABETES MELLITUS WITH HYPOGLYCEMIA WITHOUT COMA, WITH LONG-TERM CURRENT USE OF INSULIN (HCC): Primary | ICD-10-CM

## 2023-10-05 DIAGNOSIS — E11.649 TYPE 2 DIABETES MELLITUS WITH HYPOGLYCEMIA WITHOUT COMA, WITH LONG-TERM CURRENT USE OF INSULIN (HCC): Primary | ICD-10-CM

## 2023-10-05 DIAGNOSIS — Q05.9 MENINGOCELE (HCC): ICD-10-CM

## 2023-10-05 DIAGNOSIS — E23.0 GROWTH HORMONE DEFICIENCY (HCC): ICD-10-CM

## 2023-10-05 DIAGNOSIS — E23.0 HYPOGONADOTROPIC HYPOGONADISM (HCC): ICD-10-CM

## 2023-10-05 PROCEDURE — 99214 OFFICE O/P EST MOD 30 MIN: CPT | Performed by: STUDENT IN AN ORGANIZED HEALTH CARE EDUCATION/TRAINING PROGRAM

## 2023-10-05 PROCEDURE — 95251 CONT GLUC MNTR ANALYSIS I&R: CPT | Performed by: STUDENT IN AN ORGANIZED HEALTH CARE EDUCATION/TRAINING PROGRAM

## 2023-10-05 RX ORDER — INSULIN LISPRO 100 [IU]/ML
5 INJECTION, SOLUTION INTRAVENOUS; SUBCUTANEOUS
Qty: 15 ML | Refills: 0 | Status: SHIPPED | OUTPATIENT
Start: 2023-10-05

## 2023-10-05 NOTE — PROGRESS NOTES
Linda Narvaez 26 y.o. male MRN: [id number]    Encounter: [id number]      Assessment/Plan     1. T2DM - improving. Recommend continue lantus, decrease humalog 5 units with meals, resume metformin 500 mg bid. Review CGM in 1-week, consider elimination of humalog if stable. 2. 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 Bishop Woodard has childhood onset [facility] deficiency. He could benefit from [facility] therapy as an adult. We briefly discussed this today, however I would like to dedicate more time on this with him in the future. 3. 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 I advised 3-mo monitoring labs 1-week following last treatment dose. 4. Sphenoid sinus meningocele - patient would like to establish with [facility] providers. Will place referral for neurosurgery    Problem List Items Addressed This Visit        Endocrine    Type 2 diabetes mellitus ([address]) - Primary    Relevant Medications    insulin lispro (HumaLOG KwikPen) 100 units/mL injection pen    metFORMIN (GLUCOPHAGE) 500 mg tablet    Other Relevant Orders    Comprehensive metabolic panel Lab Collect    HEMOGLOBIN A1C W/ EAG ESTIMATION Lab Collect    Lipid Panel with Direct LDL reflex Lab Collect    Albumin / creatinine urine ratio    Growth hormone deficiency (HCC)    Hypogonadotropic hypogonadism (HCC)    Relevant Orders    Testosterone, free, total Lab Collect    CBC and differential Lab Collect   Other Visit Diagnoses     Meningocele ([address])        Relevant Orders    Ambulatory referral to Neurosurgery        RTC 3-mo    CC: diabetes, growth hormone deficiency, secondary hypogonadism     History of Present Illness     HPI:     Bishop Woodard returns today in follow up for DM, GH def, and hypogonadism. Presently studying accounting in school. Recently met with CDE for MNT and has been incorporating dietary changes. BG well controlled on lantus 20 units nightly, humalog 10 units with meals.  No hyperglycemic symptoms, no hypoglycemia. Currently on ramo 3 CGM. Jeovany Mcneil   Device used [facility] use     Indication   Type 2 Diabetes    More than 72 hours of data was reviewed. Report to be scanned to chart. Date Range: Sep 21- Oct 4, 2023    Analysis of data:   % time CGM used: 89%  Average Glucose:  128 mg/dL  Coefficient of Variation: 21.7%     Time in Target Range:  94%   Time Above Range: 6%   Time Below Range: 0%     Interpretation of data: excellent glycemic control. 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 He was on [address] [place] from age 12 - [age]y. Testosterone was initiated several years ago due to hypogonadotropic hypogonadism, treated at adult dosing. No spontaneous erections or ejaculation. No libido. Had some improvement in deepening of voice and pubic/axillary/facial hair with testosterone, which has been held since hospitalization several months ago. He denies polydipsia, polyuria, or nocturia. He follows with [facility] for [facility] examination yearly. Adeline Trejo has transferred care from [facility]. Review of Systems   Constitutional: Negative for appetite change and unexpected weight change. Eyes: Positive for visual disturbance. Gastrointestinal: Negative for nausea and vomiting. Endocrine: Negative for polydipsia, polyphagia and polyuria. All other systems reviewed and are negative.       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      Past Surgical History:   Procedure Laterality Date   • ANKLE SURGERY     • BACK SURGERY     • CT NEEDLE BIOPSY MEDIASTINUM  04/17/2017   • FOOT SURGERY       Social History   Social History     Substance and Sexual Activity Alcohol Use Never     Social History     Substance and Sexual Activity   Drug Use Never     Social History     Tobacco Use   Smoking Status Never   Smokeless Tobacco Never     Family History:   Family History   Problem Relation Age of Onset   • Diabetes type II Mother    • Hypertension Mother    • Hyperlipidemia Mother    • Kidney disease Mother    • Diabetes type II Father    • Hypertension Father    • Hypertension Brother    • Stroke Maternal Grandmother    • Diabetes type II Maternal Grandmother    • Hypertension Maternal Grandmother    • Factor V Leiden deficiency Maternal Grandfather    • Rheumatic fever Maternal Grandfather    • Kidney disease Maternal Grandfather    • Heart attack Maternal Grandfather    • Stroke Maternal Grandfather        Meds/Allergies   Current Outpatient Medications   Medication Sig Dispense Refill   • Alcohol Swabs 70 % PADS May substitute brand based on insurance coverage. Check glucose ACHS. 200 each 0   • Continuous Blood Gluc Sensor (FreeStyle Irina 3 Sensor) MISC Use 1 each every 14 (fourteen) days 3 each 11   • Insulin Glargine Solostar (Lantus SoloStar) 100 UNIT/ML SOPN Inject 0.2 mL (20 Units total) under the skin daily at bedtime 15 mL 2   • insulin lispro (HumaLOG KwikPen) 100 units/mL injection pen Inject 5 Units under the skin 3 (three) times a day with meals 15 mL 0   • Insulin Pen Needle (BD Pen Needle Danette 2nd Gen) 32G X 4 MM MISC For use with insulin pen. Pharmacy may dispense brand covered by insurance. 100 each 0   • Insulin Pen Needle (BD Pen Needle Danette 2nd Gen) 32G X 4 MM MISC For use with insulin pen. Pharmacy may dispense brand covered by insurance. 400 each 1   • metFORMIN (GLUCOPHAGE) 500 mg tablet Take 1 tablet (500 mg total) by mouth 2 (two) times a day with meals 60 tablet 3   • OneTouch Delica Lancets 61S MISC May substitute brand based on insurance coverage. Check glucose ACHS.  200 each 0   • Testosterone Cypionate 100 MG/ML SOLN 100 mg by Intramuscular (multi inj) route every 14 (fourteen) days     • Blood Glucose Monitoring Suppl (OneTouch Verio Reflect) w/Device KIT May substitute brand based on insurance coverage. Check glucose ACHS. (Patient not taking: Reported on 10/5/2023) 1 kit 0   • glucose blood (OneTouch Verio) test strip Use 1 each 4 (four) times a day May substitute brand based on insurance coverage. Check glucose ACHS. (Patient not taking: Reported on 10/5/2023) 400 each 0     No current facility-administered medications for this visit. No Known Allergies    Objective   Vitals: Blood pressure 98/72, pulse 78, height 5' 11" (1.803 m), weight 114 kg (251 lb 9.6 oz), SpO2 98 %. Physical Exam  Vitals reviewed. Constitutional:       General: He is not in acute distress. HENT:      Head: Normocephalic and atraumatic. Nose: Nose normal.   Cardiovascular:      Pulses: no weak pulses          Dorsalis pedis pulses are 2+ on the right side and 2+ on the left side. Pulmonary:      Effort: Pulmonary effort is normal. No respiratory distress. Abdominal:      Palpations: Abdomen is soft. Musculoskeletal:      Cervical back: Normal range of motion. Feet:      Right foot:      Skin integrity: No ulcer, skin breakdown, erythema, warmth, callus or dry skin. Left foot:      Skin integrity: No ulcer, skin breakdown, erythema, warmth, callus or dry skin. Skin:     General: Skin is warm and dry. Neurological:      General: No focal deficit present. Mental Status: He is alert. Psychiatric:         Mood and Affect: Mood normal.         Behavior: Behavior normal.         Diabetic Foot Exam    Patient's shoes and socks removed. Right Foot/Ankle   Right Foot Inspection  Skin Exam: skin normal and skin intact. No dry skin, no warmth, no callus, no erythema, no maceration, no abnormal color, no pre-ulcer, no ulcer and no callus. Sensory   Vibration: intact  Monofilament testing: intact    Vascular  The right DP pulse is 2+.      Left Foot/Ankle  Left Foot Inspection  Skin Exam: skin normal and skin intact. No dry skin, no warmth, no erythema, no maceration, normal color, no pre-ulcer, no ulcer and no callus. Sensory   Vibration: intact  Monofilament testing: intact    Vascular  The left DP pulse is 2+. Assign Risk Category  No deformity present  No loss of protective sensation  No weak pulses  Risk: 0      The history was obtained from the review of the chart, patient.     Lab Results:   Lab Results   Component Value Date/Time    Potassium 4.3 09/07/2023 08:15 AM    Potassium 3.2 (L) 08/25/2023 05:42 AM    Potassium 3.8 08/24/2023 02:02 PM    Chloride 107 09/07/2023 08:15 AM    Chloride 106 08/25/2023 05:42 AM    Chloride 109 (H) 08/24/2023 02:02 PM    CO2 30 09/07/2023 08:15 AM    CO2 26 08/25/2023 05:42 AM    CO2 23 08/24/2023 02:02 PM    BUN 8 09/07/2023 08:15 AM    BUN 6 08/25/2023 05:42 AM    BUN 7 08/24/2023 02:02 PM    Creatinine 0.75 09/07/2023 08:15 AM    Creatinine 0.60 08/25/2023 05:42 AM    Creatinine 0.77 08/24/2023 02:02 PM    Glucose, Fasting 105 (H) 09/07/2023 08:15 AM    Calcium 9.1 09/07/2023 08:15 AM    Calcium 8.2 (L) 08/25/2023 05:42 AM    Calcium 8.7 08/24/2023 02:02 PM    eGFR 131 09/07/2023 08:15 AM    eGFR 144 08/25/2023 05:42 AM    eGFR 130 08/24/2023 02:02 PM    TSH 3RD GENERATON 3.115 08/22/2023 05:19 AM    Prolactin 5.71 09/07/2023 08:15 AM    Testosterone, Free 3.6 (L) 09/07/2023 08:15 AM    FSH 0.2 (L) 09/07/2023 08:15 AM       Component      Latest Ref Rng 9/7/2023   Sodium      135 - 147 mmol/L 146    Potassium      3.5 - 5.3 mmol/L 4.3    Chloride      96 - 108 mmol/L 107    CO2      21 - 32 mmol/L 30    Anion Gap      mmol/L 9    BUN      5 - 25 mg/dL 8    Creatinine      0.60 - 1.30 mg/dL 0.75    GLUCOSE FASTING      65 - 99 mg/dL 105 (H)    Calcium      8.4 - 10.2 mg/dL 9.1    AST      13 - 39 U/L 43 (H)    ALT      7 - 52 U/L 34    Alkaline Phosphatase      34 - 104 U/L 78    Total Protein      6.4 - 8.4 g/dL 7.0    Albumin      3.5 - 5.0 g/dL 3.7    TOTAL BILIRUBIN      0.20 - 1.00 mg/dL 0.60    eGFR      ml/min/1.73sq m 131    TESTOSTERONE FREE      9.3 - 26.5 pg/mL 3.6 (L)    Testosterone, Total, LC/MS      264 - 916 ng/dL 85 (L)    Magnesium      1.9 - 2.7 mg/dL 2.2    INSULIN-LIKE GROWTH FACTOR-1      109 - 353 ng/mL 20 (L)    ACTH      7.2 - 63.3 pg/mL 28.9    FSH, POC      1.3 - 19.3 mIU/mL 0.2 (L)    PROLACTIN      2.64 - 13.13 ng/mL 5.71    Cortisol - AM      6.7 - 22.6 ug/dL 13.7       Legend:  (H) High  (L) Low    Lab Results   Component Value Date    HGBA1C 8.8 (H) 07/03/2023         Imaging Studies:  Results for orders placed during the hospital encounter of 09/20/23    MRI brain pituitary wo and w contrast    Impression  1. Large sphenoid sinus meningocele, causing retraction of the optic chiasm and floor of the third ventricle with adjacent fat packing along the anterior margin in the nasal cavity. Findings may be related to an acquired meningocele versus attempted  prior repair of a congenital meningocele. Correlation with neurosurgical and clinical history advised. Comparison with prior studies would be useful. If they become available for direct comparison, an addendum could be issued. 2. Normal pituitary tissue is not identified. 3. No acute infarction, intracranial hemorrhage or enhancing mass lesion. Workstation performed: [id number]    ? I have personally reviewed pertinent reports. Portions of the record may have been created with voice recognition software. Occasional wrong word or "sound a like" substitutions may have occurred due to the inherent limitations of voice recognition software. Read the chart carefully and recognize, using context, where substitutions have occurred.

## 2023-10-05 NOTE — PATIENT INSTRUCTIONS
Continue lantus 20 units nightly    Decrease Humalog 5 units with meals    Resume metformin 500 mg twice a day with meals    I will contact you in 2-weeks after reviewing ramo to decide whether to stop humalog. Contact me sooner with concerns. Resume testosterone 100 mg every 2-weeks.  In 3-months, you should have labs done and they should be completed 1-week after your last treatment dose

## 2023-10-16 DIAGNOSIS — E11.649 TYPE 2 DIABETES MELLITUS WITH HYPOGLYCEMIA WITHOUT COMA, WITH LONG-TERM CURRENT USE OF INSULIN (HCC): ICD-10-CM

## 2023-10-16 DIAGNOSIS — Z79.4 TYPE 2 DIABETES MELLITUS WITH HYPOGLYCEMIA WITHOUT COMA, WITH LONG-TERM CURRENT USE OF INSULIN (HCC): ICD-10-CM

## 2023-10-16 RX ORDER — BLOOD-GLUCOSE SENSOR
1 EACH MISCELLANEOUS
Qty: 3 EACH | Refills: 0 | Status: SHIPPED | OUTPATIENT
Start: 2023-10-16

## 2023-11-14 DIAGNOSIS — E11.649 TYPE 2 DIABETES MELLITUS WITH HYPOGLYCEMIA WITHOUT COMA, WITH LONG-TERM CURRENT USE OF INSULIN (HCC): ICD-10-CM

## 2023-11-14 DIAGNOSIS — Z79.4 TYPE 2 DIABETES MELLITUS WITH HYPOGLYCEMIA WITHOUT COMA, WITH LONG-TERM CURRENT USE OF INSULIN (HCC): ICD-10-CM

## 2023-11-14 RX ORDER — BLOOD-GLUCOSE SENSOR
1 EACH MISCELLANEOUS
Qty: 3 EACH | Refills: 0 | Status: SHIPPED | OUTPATIENT
Start: 2023-11-14

## 2023-11-30 ENCOUNTER — OFFICE VISIT (OUTPATIENT)
Dept: ENDOCRINOLOGY | Facility: OTHER | Age: 23
End: 2023-11-30
Payer: COMMERCIAL

## 2023-11-30 VITALS — WEIGHT: 249.6 LBS | BODY MASS INDEX: 34.81 KG/M2

## 2023-11-30 DIAGNOSIS — E11.9 TYPE 2 DIABETES MELLITUS WITHOUT COMPLICATION, WITHOUT LONG-TERM CURRENT USE OF INSULIN (HCC): Primary | ICD-10-CM

## 2023-11-30 PROCEDURE — 97803 MED NUTRITION INDIV SUBSEQ: CPT

## 2023-11-30 NOTE — PATIENT INSTRUCTIONS
Eat 3 meals per day, 4-5 hours apart. No meal skipping. Eat 45-60 grams of carbohydrate per meal and 15-20 grams per snack     When eating an early dinner, have a snack before bed. Set food up using Healthy Plate that contains carb, protein, and non-starchy vegetables.

## 2023-11-30 NOTE — PROGRESS NOTES
Medical Nutrition Therapy        Assessment    Visit Type: Follow-up visit  Chief complaint/Medical Diagnosis/reason for visit E11.9    HPI Jeanne Hodges was seen in person for the follow-up MNT appointment. Patient was pleasant and willing to share assessment information. BG levels are checked frequently using his [place]. No low BG levels displayed on [employer]. Patient has some high BG levels around 12 am. Patient does take diabetes medication as prescribed. Discussed adding a bedtime snack when eating an early dinner. Patient reported no current exercise regimen other than walking to college classes. Problems identified in food recall include inconsistent carbohydrate intake, imbalanced meals, limited non-starchy vegetables and whole grains. Consumption of carbohydrates ranges from 30  to ~45 grams per meal.     Portion booklet and food labels were used to teach basic carbohydrate counting. Patient agreed to keep daily food logs and return them in 6 months for assessment. RD will remain available for further dietary questions/concerns.          Ht Readings from Last 1 Encounters:   10/05/23 5' 11" (1.803 m)     Wt Readings from Last 3 Encounters:   10/05/23 114 kg (251 lb 9.6 oz)   09/21/23 113 kg (249 lb 12.8 oz)   09/01/23 110 kg (243 lb 3.2 oz)     Weight Change: No    Barriers to Learning: no barriers    Do you follow any special diet presently?: No  Who shops:  meal plan at [employer]  Who cooks:  meal plan at [address] [place] [address]: Completed via the method of food recall    Wakes up 6-8:30 (rarely 10 am)    Exercise Walking between classes    Calorie needs 2055 kcals/day Carbs: 45-60 g/meal, 15-20 g/snack     Fat: 5 servings/day    Protein:10 ounces/day    Nutrition Diagnosis:  Inconsistent carbohydrate intake  intake related to Physiological causes requiring careful timing and consistency in the amount of carbohydrate (i.e. diabetes mellitus, hypoglycemia) as evidenced by  Estimated carbohydrate intake that is different from recommended types or ingested on an irregular basis    Intervention: plate method, label reading, carbohydrate counting, meal timing, meal planning, monitoring portion control, and food diary     Treatment Goals: Patient understands education and recommendations, Patient will monitor food intake daily with tracker, Patient will consume 3 meals a day, Patient will increase their intake of plant based foods, Patient will count carbohydrates, and Patient will monitor blood glucose    Monitoring and evaluation:    Term code indicator  FH 1.3.2 Food Intake Criteria: Eat 3 meals per day, 4-5 hours apart. No meal skipping. Term code indicator  FH 1.6.3 Carbohydrate Intake Criteria: Eat 45-60 grams of carbohydrate per meal and 15-20 grams per snack   Term code indicator  FH 1.3.2 Food Intake Criteria: When eating an early dinner, have a snack before bed. Term code indicator  FH 1.3.2 Food Intake Criteria: Set food up using Healthy Plate that contains carb, protein, and non-starchy vegetables. Materials Provided: 3-day food log    Patient’s Response to Instruction:  Comprehensiongood  Motivationgood  Expected Compliancegood    Begin Time: 2:15 pm  End Time: 2:47 pm  Referring Provider: Aicha Wilkins PA-C    Thank you for coming to the [address] [place] [address]  for education today. Please feel free to call with any questions or concerns.     Wilian Angela, [id number] [facility]  [address]   [phone]

## 2023-12-01 ENCOUNTER — TELEPHONE (OUTPATIENT)
Dept: ENDOCRINOLOGY | Facility: CLINIC | Age: 23
End: 2023-12-01

## 2023-12-01 NOTE — TELEPHONE ENCOUNTER
Just an FYI- Neurosurgery was unable to contact patient to schedule. They called 3 times and mailed a letter.

## 2023-12-20 DIAGNOSIS — E11.649 TYPE 2 DIABETES MELLITUS WITH HYPOGLYCEMIA WITHOUT COMA, WITH LONG-TERM CURRENT USE OF INSULIN (HCC): ICD-10-CM

## 2023-12-20 DIAGNOSIS — Z79.4 TYPE 2 DIABETES MELLITUS WITH HYPOGLYCEMIA WITHOUT COMA, WITH LONG-TERM CURRENT USE OF INSULIN (HCC): ICD-10-CM

## 2023-12-20 RX ORDER — BLOOD-GLUCOSE SENSOR
1 EACH MISCELLANEOUS
Qty: 3 EACH | Refills: 0 | Status: SHIPPED | OUTPATIENT
Start: 2023-12-20

## 2024-01-25 DIAGNOSIS — Z79.4 TYPE 2 DIABETES MELLITUS WITH HYPOGLYCEMIA WITHOUT COMA, WITH LONG-TERM CURRENT USE OF INSULIN (HCC): ICD-10-CM

## 2024-01-25 DIAGNOSIS — E11.649 TYPE 2 DIABETES MELLITUS WITH HYPOGLYCEMIA WITHOUT COMA, WITH LONG-TERM CURRENT USE OF INSULIN (HCC): ICD-10-CM

## 2024-01-26 RX ORDER — BLOOD-GLUCOSE SENSOR
1 EACH MISCELLANEOUS
Qty: 3 EACH | Refills: 0 | Status: SHIPPED | OUTPATIENT
Start: 2024-01-26

## 2024-02-27 DIAGNOSIS — Z79.4 TYPE 2 DIABETES MELLITUS WITH HYPOGLYCEMIA WITHOUT COMA, WITH LONG-TERM CURRENT USE OF INSULIN (HCC): ICD-10-CM

## 2024-02-27 DIAGNOSIS — E11.649 TYPE 2 DIABETES MELLITUS WITH HYPOGLYCEMIA WITHOUT COMA, WITH LONG-TERM CURRENT USE OF INSULIN (HCC): ICD-10-CM

## 2024-02-27 RX ORDER — BLOOD-GLUCOSE SENSOR
1 EACH MISCELLANEOUS
Qty: 3 EACH | Refills: 0 | Status: SHIPPED | OUTPATIENT
Start: 2024-02-27

## 2024-03-05 ENCOUNTER — APPOINTMENT (OUTPATIENT)
Age: 24
End: 2024-03-05
Payer: COMMERCIAL

## 2024-03-05 ENCOUNTER — OFFICE VISIT (OUTPATIENT)
Age: 24
End: 2024-03-05
Payer: COMMERCIAL

## 2024-03-05 VITALS
BODY MASS INDEX: 33.86 KG/M2 | TEMPERATURE: 98.4 F | WEIGHT: 250 LBS | DIASTOLIC BLOOD PRESSURE: 76 MMHG | OXYGEN SATURATION: 96 % | HEART RATE: 107 BPM | HEIGHT: 72 IN | SYSTOLIC BLOOD PRESSURE: 106 MMHG | RESPIRATION RATE: 17 BRPM

## 2024-03-05 DIAGNOSIS — M79.671 RIGHT FOOT PAIN: Primary | ICD-10-CM

## 2024-03-05 DIAGNOSIS — M79.671 RIGHT FOOT PAIN: ICD-10-CM

## 2024-03-05 PROCEDURE — 99213 OFFICE O/P EST LOW 20 MIN: CPT | Performed by: PHYSICIAN ASSISTANT

## 2024-03-05 PROCEDURE — 73630 X-RAY EXAM OF FOOT: CPT

## 2024-03-05 NOTE — PROGRESS NOTES
[facility]        NAME: Ted Mayen is a 23 y.o. male  : 2000    MRN: [id number]  DATE: 2024  TIME: 5:03 PM    Assessment and Plan   Right foot pain [M79.671]  1. Right foot pain  XR foot 3+ vw right    Ambulatory Referral to Podiatry            Patient Instructions     Patient has recent onset of right foot pain and has had previous longstanding issues with his right foot that stems from a lumbar spine lesion.  X-ray performed today does not reveal any acute findings.  I recommended conservative measures including ice, elevation, limited weightbearing, Tylenol/NSAIDs, and he was given crutches to help aid in ambulation.  I referred him to podiatry for consult if issues persist or worsen.  Follow up with PCP in 3-5 days.  Proceed to  ER if symptoms worsen.    If tests have been performed at [facility], our office will contact you with results if changes need to be made to the care plan discussed with you at the visit.  You can review your full results on [facility] MyChart.    Chief Complaint     Chief Complaint   Patient presents with    Foot Pain     Starting  - began to feel pain in RIGHT foot. Worsening since onset. Denies specific injury. States that pain continues to increase.         History of Present Illness       Patient presents with 2-day history of acute pain in his right foot.  He denies any known injury or trauma or any change in level or type of physical activity.  He reports the pain is worse with walking and weightbearing and he is concerned about being able to get around campus as he is a college student at [employer].  He has had surgery performed on the right foot which stems from a lumbar spine lesion.  He reports the pain is mostly plantar in the midfoot.  Denies any swelling or bruising or any skin color changes.        Review of Systems   Review of Systems   Constitutional: Negative.    Respiratory: Negative.     Cardiovascular: Negative.     Gastrointestinal: Negative.    Genitourinary: Negative.    Musculoskeletal:         Acute right foot pain, no known trauma   Neurological: Negative.          Current Medications       Current Outpatient Medications:     Alcohol Swabs 70 % PADS, May substitute brand based on insurance coverage. Check glucose ACHS., Disp: 200 each, Rfl: 0    Blood Glucose Monitoring Suppl (OneTouch Verio Reflect) w/Device KIT, May substitute brand based on insurance coverage. Check glucose ACHS., Disp: 1 kit, Rfl: 0    Continuous Blood Gluc Sensor (FreeStyle Irina 3 Sensor) MISC, Use 1 each every 14 (fourteen) days, Disp: 3 each, Rfl: 0    glucose blood (OneTouch Verio) test strip, Use 1 each 4 (four) times a day May substitute brand based on insurance coverage. Check glucose ACHS., Disp: 400 each, Rfl: 0    Insulin Glargine Solostar (Lantus SoloStar) 100 UNIT/ML SOPN, Inject 0.2 mL (20 Units total) under the skin daily at bedtime, Disp: 15 mL, Rfl: 2    Insulin Pen Needle (BD Pen Needle Danette 2nd Gen) 32G X 4 MM MISC, For use with insulin pen. Pharmacy may dispense brand covered by insurance., Disp: 100 each, Rfl: 0    metFORMIN (GLUCOPHAGE) 500 mg tablet, Take 1 tablet (500 mg total) by mouth 2 (two) times a day with meals, Disp: 60 tablet, Rfl: 3    Insulin Pen Needle (BD Pen Needle Danette 2nd Gen) 32G X 4 MM MISC, For use with insulin pen. Pharmacy may dispense brand covered by insurance. (Patient not taking: Reported on 11/30/2023), Disp: 400 each, Rfl: 1    OneTouch Delica Lancets 33G MISC, May substitute brand based on insurance coverage. Check glucose ACHS. (Patient not taking: Reported on 11/30/2023), Disp: 200 each, Rfl: 0    Testosterone Cypionate 100 MG/ML SOLN, 100 mg by Intramuscular (multi inj) route every 14 (fourteen) days (Patient not taking: Reported on 3/5/2024), Disp: , Rfl:     Current Allergies     Allergies as of 03/05/2024    (No Known Allergies)            The following portions of the patient's history were  reviewed and updated as appropriate: allergies, current medications, past family history, past medical history, past social history, past surgical history and problem list.     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        Past Surgical History:   Procedure Laterality Date    ANKLE SURGERY      BACK SURGERY      CT NEEDLE BIOPSY MEDIASTINUM  04/17/2017    FOOT SURGERY         Family History   Problem Relation Age of Onset    Diabetes type II Mother     Hypertension Mother     Hyperlipidemia Mother     Kidney disease Mother     Diabetes type II Father     Hypertension Father     Hypertension Brother     Stroke Maternal Grandmother     Diabetes type II Maternal Grandmother     Hypertension Maternal Grandmother     Factor V Leiden deficiency Maternal Grandfather     Rheumatic fever Maternal Grandfather     Kidney disease Maternal Grandfather     Heart attack Maternal Grandfather     Stroke Maternal Grandfather          Medications have been verified.        Objective   /76 (BP Location: Left arm, Patient Position: Sitting, Cuff Size: Standard)   Pulse (!) 107   Temp 98.4 °F (36.9 °C) (Tympanic)   Resp 17   Ht 6' (1.829 m)   Wt 113 kg (250 lb)   SpO2 96%   BMI 33.91 kg/m²   No LMP for male patient.       Physical Exam     Physical Exam  Vitals reviewed.   Constitutional:       General: He is not in acute distress.     Appearance: He is well-developed.   Musculoskeletal:      Comments: Tenderness to palpation over the right midfoot most prominently plantar aspect.  There is no swelling or ecchymosis.  No significant skin color changes.  Patient does have overall foot deformity with healed surgical scars.   Neurological:      Mental Status: He is alert and oriented to person, place, and time.       Sensory: No sensory deficit.

## 2024-04-01 DIAGNOSIS — Z79.4 TYPE 2 DIABETES MELLITUS WITH HYPOGLYCEMIA WITHOUT COMA, WITH LONG-TERM CURRENT USE OF INSULIN (HCC): ICD-10-CM

## 2024-04-01 DIAGNOSIS — E11.649 TYPE 2 DIABETES MELLITUS WITH HYPOGLYCEMIA WITHOUT COMA, WITH LONG-TERM CURRENT USE OF INSULIN (HCC): ICD-10-CM

## 2024-04-01 RX ORDER — BLOOD-GLUCOSE SENSOR
1 EACH MISCELLANEOUS
Qty: 3 EACH | Refills: 0 | Status: SHIPPED | OUTPATIENT
Start: 2024-04-01

## 2024-05-23 ENCOUNTER — OFFICE VISIT (OUTPATIENT)
Dept: ENDOCRINOLOGY | Facility: OTHER | Age: 24
End: 2024-05-23
Payer: COMMERCIAL

## 2024-05-23 VITALS — WEIGHT: 260.4 LBS | BODY MASS INDEX: 35.32 KG/M2

## 2024-05-23 DIAGNOSIS — E11.9 TYPE 2 DIABETES MELLITUS WITHOUT COMPLICATION, WITHOUT LONG-TERM CURRENT USE OF INSULIN (HCC): Primary | ICD-10-CM

## 2024-05-23 PROCEDURE — 97803 MED NUTRITION INDIV SUBSEQ: CPT

## 2024-05-23 NOTE — PROGRESS NOTES
Medical Nutrition Therapy        Assessment    Visit Type: Follow-up visit  Chief complaint/Medical Diagnosis/reason for visit E11.9, Z79.4    HPI Ted was seen in person for the follow-up MNT appointment. BG levels are checked frequently using his Libre3. Lately, patient has experienced hyperglycemia and overall has worsened BG levels. From the previous session his 11/16/23-11/29/23 Irina data show significant differences. TIR was 86%, High was 13%, and Very High was 1% , no lows and now his TIR is 30%. See below for the LibreView current data. Patient does take diabetes medication as prescribed.     Patient reported a current exercise regimen of walking his dog. Stressed the need to increase his level of exercise. Set goals for cardio and resistance training today.  Asked patient to use a phone mj to track his exercise progress. Weight gain of 11 lbs in 9 months has occurred.    Diet-garcia, Ted has variable carb intake, sometimes skips meals, and is unaware of the amount of carbs eaten in certain meals. Consumption of carbohydrates ranges from 15 to 60+ grams per meal.     Patient agreed to keep daily food logs and return them in 2 months for assessment. RD will remain available for further dietary questions/concerns.       Ht Readings from Last 1 Encounters:   03/05/24 6' (1.829 m)     Wt Readings from Last 3 Encounters:   05/23/24 118 kg (260 lb 6.4 oz)   03/05/24 113 kg (250 lb)   11/30/23 113 kg (249 lb 9.6 oz)     Weight Change: Yes 11 lbs increase in 9 months    Barriers to Learning: no barriers    Do you follow any special diet presently?: No  Who shops: mother and father  Who cooks: mother and father    Food Log: Completed via the method of food recall    Wakes up 8-9 am    Breakfast:9;30 am; skips (eats 5 out of 7 days) eggs with slice of toast (white) w/ butter or margarine w/ fruit (if he has some) with water and or 1 c milk (1%)  Morning Snack:none  Lunch:12;30-1 pm; left over handful of  cheese tortellini w/ parmesan cheese with raw baby carrots with water OR grilled cheese w/ fruit or a 1/2 c veggie (corn, cauliflower, broccoli, pepper)  Afternoon Snack: none  Dinner:6:30-7 pm; kabasi with 1/2 c tortellini with 1/2 c corn with water or zero vitamin water  Evening Snack:9 pm; ice cream sandwich or fruit with peanut butter or veggies  Beverages: water, SF oumar  Eating out/Take out:1x every 2 weeks; salmon with broccoli and potato and shrimp with water OR 2 slices pizza  Exercise walking daily with dog 30 minutes twice    Calorie needs 2055 kcals/day Carbs: 45-60 g/meal, 15-30 g/snack     Protein:10 ounces/day    Fat: 5 servings/day        Nutrition Diagnosis:  Self-monitoring deficit  related to Food and nutrition related knowledge deficit concerning self monitoring  as evidenced by Incomplete self monitoring records (i.e. glucose, food, fluid intake, weight, physical activity)    Intervention: label reading, carbohydrate counting, meal timing, exercise guidelines, and food diary     Treatment Goals: Patient understands education and recommendations, Patient will monitor food intake daily with tracker, Patient will consume 3 meals a day, Patient will count carbohydrates, Patient will exercise, and Patient will monitor blood glucose    Monitoring and evaluation:    Term code indicator  FH 1.3.2 Food Intake Criteria: Eat 3 meals per day, 4-5 hours apart. No meal skipping.   Term code indicator  FH 1.6.3 Carbohydrate Intake Criteria: Eat 45-60 grams of carbohydrate per meal and 15 grams per snack.   Term code indicator  CH 2.2 Treatments/Therapy/Alternative Medicine Criteria: Exercise goals: 1.Walk up and down flight of steps 3 days weekly for 15 minutes and build up to the moderate physical activity recommendations. Look up 5 resistance exercises and start performing them.    Materials Provided: 3-day food log    Patient’s Response to Instruction:  Comprehensiongood  Motivationgood  Expected  Compliancegood    Begin Time: 2:15 pm  End Time: 2:50 pm  Referring Provider: Shlomo Estevez, DO    Thank you for coming to the [facility] for education today.  Please feel free to call with any questions or concerns.    Viktoriya Wolf, RD  [address] [id number] [phone]

## 2024-05-23 NOTE — PATIENT INSTRUCTIONS
Exercise goals:     Walk up and down flight of steps 3 days weekly for 15 minutes and build up to the moderate physical activity recommendations.  Look up 5 resistance exercises and start performing them.    Eat 3 meals per day, 4-5 hours apart. No meal skipping.     Eat 45-60 grams of carbohydrate per meal and 15 grams per snack.

## 2024-05-31 DIAGNOSIS — Z79.4 TYPE 2 DIABETES MELLITUS WITH HYPOGLYCEMIA WITHOUT COMA, WITH LONG-TERM CURRENT USE OF INSULIN (HCC): ICD-10-CM

## 2024-05-31 DIAGNOSIS — E11.649 TYPE 2 DIABETES MELLITUS WITH HYPOGLYCEMIA WITHOUT COMA, WITH LONG-TERM CURRENT USE OF INSULIN (HCC): ICD-10-CM

## 2024-06-01 RX ORDER — BLOOD-GLUCOSE SENSOR
1 EACH MISCELLANEOUS
Qty: 3 EACH | Refills: 1 | Status: SHIPPED | OUTPATIENT
Start: 2024-06-01

## 2024-08-21 DIAGNOSIS — E11.649 TYPE 2 DIABETES MELLITUS WITH HYPOGLYCEMIA WITHOUT COMA, WITH LONG-TERM CURRENT USE OF INSULIN (HCC): ICD-10-CM

## 2024-08-21 DIAGNOSIS — Z79.4 TYPE 2 DIABETES MELLITUS WITH HYPOGLYCEMIA WITHOUT COMA, WITH LONG-TERM CURRENT USE OF INSULIN (HCC): ICD-10-CM

## 2024-08-21 RX ORDER — BLOOD-GLUCOSE SENSOR
1 EACH MISCELLANEOUS
Qty: 3 EACH | Refills: 1 | Status: SHIPPED | OUTPATIENT
Start: 2024-08-21

## 2024-09-10 NOTE — CONSULTS
----- Message from Aubree Olivier sent at 9/10/2024 10:17 AM CDT -----  Contact: Dr ROBERT Nair office  Type:  Needs Medical Advice    Who Called: Dr ROBERT Nair office    Would the patient rather a call back or a response via [facility]?  Call back    Best Call Back Number: [phone]    Additional Information: pt was not aware of procedure    Doesn't know what time to be there    Please call to advise    Thanks   Consult received for DM instruction. Author initiated extensive diet ed on 8/22, mother and pt with additional questions. Mother asking how many grams of carbs to aim for per day. Based off kcal amount that would aid in weight loss/~45% kcal, would recommend 250 grams carbs per day, 75 grams carbs per meal with 1-2 snacks per day. Discussed breakfast options such as kind bar with fruit or Glucerna hunger smart shake or fruit with cashews all of which pt is willing to eat, pt with various foods he does not like/willing to eat. Discussed how to determine net carb amount, encouraged high fiber foods. Also encouraged balanced meals/similar amount of carbs at each meal. RN to relay Choose Your Foods List to pt/mother which contains combination foods carbohydrate content. Pt would like to follow up with author "this is the most I've learned about DM in the past two years." Recommend OP MNT referral. Provided with RD contact information.

## 2024-10-18 ENCOUNTER — TELEPHONE (OUTPATIENT)
Dept: DIABETES SERVICES | Facility: CLINIC | Age: 24
End: 2024-10-18

## 2024-10-18 DIAGNOSIS — E11.9 TYPE 2 DIABETES MELLITUS WITHOUT COMPLICATION, WITHOUT LONG-TERM CURRENT USE OF INSULIN (HCC): Primary | ICD-10-CM

## 2024-10-18 NOTE — TELEPHONE ENCOUNTER
Please enter a referral for the patient to see diabetes education on 10/24 for a MNT follow up. Thank you.

## 2024-10-22 ENCOUNTER — TELEPHONE (OUTPATIENT)
Dept: DIABETES SERVICES | Facility: CLINIC | Age: 24
End: 2024-10-22

## 2024-10-24 NOTE — TELEPHONE ENCOUNTER
Please enter education referral for MNT. Patient will be seeing Viktoriya today 10/24/24. Thank you

## 2025-02-08 ENCOUNTER — APPOINTMENT (EMERGENCY)
Dept: CT IMAGING | Facility: HOSPITAL | Age: 25
End: 2025-02-08
Payer: COMMERCIAL

## 2025-02-08 ENCOUNTER — HOSPITAL ENCOUNTER (EMERGENCY)
Facility: HOSPITAL | Age: 25
Discharge: HOME | End: 2025-02-08
Attending: EMERGENCY MEDICINE
Payer: COMMERCIAL

## 2025-02-08 ENCOUNTER — APPOINTMENT (EMERGENCY)
Dept: RADIOLOGY | Facility: HOSPITAL | Age: 25
End: 2025-02-08
Payer: COMMERCIAL

## 2025-02-08 VITALS
RESPIRATION RATE: 18 BRPM | TEMPERATURE: 97.7 F | BODY MASS INDEX: 31.07 KG/M2 | DIASTOLIC BLOOD PRESSURE: 76 MMHG | HEART RATE: 116 BPM | WEIGHT: 229.06 LBS | OXYGEN SATURATION: 98 % | SYSTOLIC BLOOD PRESSURE: 114 MMHG

## 2025-02-08 DIAGNOSIS — E11.9 TYPE 2 DIABETES MELLITUS WITHOUT COMPLICATION, UNSPECIFIED WHETHER LONG TERM INSULIN USE (HCC): ICD-10-CM

## 2025-02-08 DIAGNOSIS — R73.9 HYPERGLYCEMIA: Primary | ICD-10-CM

## 2025-02-08 LAB
ALBUMIN SERPL BCG-MCNC: 4.1 G/DL (ref 3.5–5)
ALP SERPL-CCNC: 67 U/L (ref 34–104)
ALT SERPL W P-5'-P-CCNC: 90 U/L (ref 7–52)
ANION GAP SERPL CALCULATED.3IONS-SCNC: 9 MMOL/L (ref 4–13)
AST SERPL W P-5'-P-CCNC: 90 U/L (ref 13–39)
B-OH-BUTYR SERPL-MCNC: 0.96 MMOL/L (ref 0.02–0.27)
BACTERIA UR QL AUTO: ABNORMAL /HPF
BASE EX.OXY STD BLDV CALC-SCNC: 72.1 % (ref 60–80)
BASE EXCESS BLDV CALC-SCNC: -1.2 MMOL/L
BASOPHILS # BLD AUTO: 0.02 THOUSANDS/ΜL (ref 0–0.1)
BASOPHILS NFR BLD AUTO: 0 % (ref 0–1)
BILIRUB SERPL-MCNC: 0.82 MG/DL (ref 0.2–1)
BILIRUB UR QL STRIP: NEGATIVE
BNP SERPL-MCNC: 56 PG/ML (ref 0–100)
BUN SERPL-MCNC: 13 MG/DL (ref 5–25)
CALCIUM SERPL-MCNC: 9.5 MG/DL (ref 8.4–10.2)
CARDIAC TROPONIN I PNL SERPL HS: 3 NG/L (ref ?–50)
CHLORIDE SERPL-SCNC: 104 MMOL/L (ref 96–108)
CLARITY UR: CLEAR
CO2 SERPL-SCNC: 26 MMOL/L (ref 21–32)
COLOR UR: YELLOW
CREAT SERPL-MCNC: 0.93 MG/DL (ref 0.6–1.3)
EOSINOPHIL # BLD AUTO: 0 THOUSAND/ΜL (ref 0–0.61)
EOSINOPHIL NFR BLD AUTO: 0 % (ref 0–6)
ERYTHROCYTE [DISTWIDTH] IN BLOOD BY AUTOMATED COUNT: 11.9 % (ref 11.6–15.1)
GFR SERPL CREATININE-BSD FRML MDRD: 116 ML/MIN/1.73SQ M
GLUCOSE SERPL-MCNC: 366 MG/DL (ref 65–140)
GLUCOSE SERPL-MCNC: 419 MG/DL (ref 65–140)
GLUCOSE SERPL-MCNC: 424 MG/DL (ref 65–140)
GLUCOSE SERPL-MCNC: 454 MG/DL (ref 65–140)
GLUCOSE UR STRIP-MCNC: ABNORMAL MG/DL
HCO3 BLDV-SCNC: 24.1 MMOL/L (ref 24–30)
HCT VFR BLD AUTO: 48.4 % (ref 36.5–49.3)
HGB BLD-MCNC: 15.8 G/DL (ref 12–17)
HGB UR QL STRIP.AUTO: NEGATIVE
IMM GRANULOCYTES # BLD AUTO: 0.04 THOUSAND/UL (ref 0–0.2)
IMM GRANULOCYTES NFR BLD AUTO: 1 % (ref 0–2)
KETONES UR STRIP-MCNC: ABNORMAL MG/DL
LEUKOCYTE ESTERASE UR QL STRIP: ABNORMAL
LIPASE SERPL-CCNC: 38 U/L (ref 11–82)
LYMPHOCYTES # BLD AUTO: 0.91 THOUSANDS/ΜL (ref 0.6–4.47)
LYMPHOCYTES NFR BLD AUTO: 14 % (ref 14–44)
MCH RBC QN AUTO: 30.3 PG (ref 26.8–34.3)
MCHC RBC AUTO-ENTMCNC: 32.6 G/DL (ref 31.4–37.4)
MCV RBC AUTO: 93 FL (ref 82–98)
MONOCYTES # BLD AUTO: 0.98 THOUSAND/ΜL (ref 0.17–1.22)
MONOCYTES NFR BLD AUTO: 15 % (ref 4–12)
NEUTROPHILS # BLD AUTO: 4.76 THOUSANDS/ΜL (ref 1.85–7.62)
NEUTS SEG NFR BLD AUTO: 70 % (ref 43–75)
NITRITE UR QL STRIP: NEGATIVE
NON-SQ EPI CELLS URNS QL MICRO: ABNORMAL /HPF
NRBC BLD AUTO-RTO: 0 /100 WBCS
O2 CT BLDV-SCNC: 15.7 ML/DL
PCO2 BLDV: 42.7 MM HG (ref 42–50)
PH BLDV: 7.37 [PH] (ref 7.3–7.4)
PH UR STRIP.AUTO: 6 [PH]
PLATELET # BLD AUTO: 179 THOUSANDS/UL (ref 149–390)
PMV BLD AUTO: 10.6 FL (ref 8.9–12.7)
PO2 BLDV: 38.2 MM HG (ref 35–45)
POTASSIUM SERPL-SCNC: 4.3 MMOL/L (ref 3.5–5.3)
PROT SERPL-MCNC: 7.6 G/DL (ref 6.4–8.4)
PROT UR STRIP-MCNC: NEGATIVE MG/DL
RBC # BLD AUTO: 5.21 MILLION/UL (ref 3.88–5.62)
RBC #/AREA URNS AUTO: ABNORMAL /HPF
S PYO DNA THROAT QL NAA+PROBE: NOT DETECTED
SODIUM SERPL-SCNC: 139 MMOL/L (ref 135–147)
SP GR UR STRIP.AUTO: 1.01 (ref 1–1.03)
UROBILINOGEN UR QL STRIP.AUTO: 0.2 E.U./DL
WBC # BLD AUTO: 6.71 THOUSAND/UL (ref 4.31–10.16)
WBC #/AREA URNS AUTO: ABNORMAL /HPF

## 2025-02-08 PROCEDURE — 83690 ASSAY OF LIPASE: CPT | Performed by: EMERGENCY MEDICINE

## 2025-02-08 PROCEDURE — 93005 ELECTROCARDIOGRAM TRACING: CPT

## 2025-02-08 PROCEDURE — 85025 COMPLETE CBC W/AUTO DIFF WBC: CPT

## 2025-02-08 PROCEDURE — 82805 BLOOD GASES W/O2 SATURATION: CPT | Performed by: EMERGENCY MEDICINE

## 2025-02-08 PROCEDURE — 36415 COLL VENOUS BLD VENIPUNCTURE: CPT

## 2025-02-08 PROCEDURE — 82948 REAGENT STRIP/BLOOD GLUCOSE: CPT

## 2025-02-08 PROCEDURE — 80053 COMPREHEN METABOLIC PANEL: CPT

## 2025-02-08 PROCEDURE — 84484 ASSAY OF TROPONIN QUANT: CPT | Performed by: EMERGENCY MEDICINE

## 2025-02-08 PROCEDURE — 83880 ASSAY OF NATRIURETIC PEPTIDE: CPT | Performed by: EMERGENCY MEDICINE

## 2025-02-08 PROCEDURE — 70450 CT HEAD/BRAIN W/O DYE: CPT

## 2025-02-08 PROCEDURE — 96360 HYDRATION IV INFUSION INIT: CPT

## 2025-02-08 PROCEDURE — 96372 THER/PROPH/DIAG INJ SC/IM: CPT

## 2025-02-08 PROCEDURE — 71045 X-RAY EXAM CHEST 1 VIEW: CPT

## 2025-02-08 PROCEDURE — 82010 KETONE BODYS QUAN: CPT | Performed by: EMERGENCY MEDICINE

## 2025-02-08 PROCEDURE — 96361 HYDRATE IV INFUSION ADD-ON: CPT

## 2025-02-08 PROCEDURE — 87651 STREP A DNA AMP PROBE: CPT | Performed by: EMERGENCY MEDICINE

## 2025-02-08 PROCEDURE — 81001 URINALYSIS AUTO W/SCOPE: CPT | Performed by: EMERGENCY MEDICINE

## 2025-02-08 PROCEDURE — 99285 EMERGENCY DEPT VISIT HI MDM: CPT

## 2025-02-08 PROCEDURE — 99284 EMERGENCY DEPT VISIT MOD MDM: CPT | Performed by: EMERGENCY MEDICINE

## 2025-02-08 RX ORDER — INSULIN LISPRO 100 [IU]/ML
5 INJECTION, SOLUTION INTRAVENOUS; SUBCUTANEOUS ONCE
Status: COMPLETED | OUTPATIENT
Start: 2025-02-08 | End: 2025-02-08

## 2025-02-08 RX ADMIN — INSULIN LISPRO 5 UNITS: 100 INJECTION, SOLUTION INTRAVENOUS; SUBCUTANEOUS at 08:02

## 2025-02-08 RX ADMIN — INSULIN LISPRO 5 UNITS: 100 INJECTION, SOLUTION INTRAVENOUS; SUBCUTANEOUS at 07:21

## 2025-02-08 RX ADMIN — SODIUM CHLORIDE 1000 ML: 0.9 INJECTION, SOLUTION INTRAVENOUS at 06:42

## 2025-02-08 RX ADMIN — SODIUM CHLORIDE 1000 ML: 0.9 INJECTION, SOLUTION INTRAVENOUS at 05:56

## 2025-02-08 RX ADMIN — SODIUM CHLORIDE 1000 ML: 0.9 INJECTION, SOLUTION INTRAVENOUS at 08:04

## 2025-02-08 NOTE — ED PROVIDER NOTES
Time reflects when diagnosis was documented in both MDM as applicable and the Disposition within this note       Time User Action Codes Description Comment    2/8/2025  9:05 AM Jamar Brito Add [R73.9] Hyperglycemia     2/8/2025  9:06 AM Jamar Brito Add [E11.9] Type 2 diabetes mellitus without complication, unspecified whether long term insulin use (HCC)           ED Disposition       ED Disposition   Discharge    Condition   Stable    Date/Time   Sat Feb 8, 2025  9:05 AM    Comment   Ted Mayen discharge to home/self care.                   Assessment & Plan       Medical Decision Making  24-year-old male presents to the emergency department with hyperglycemia, differential diagnosis include COVID, flu, strep, pneumonia, HHS, sepsis, strep pharyngitis, will perform viral panel, chest x-ray, basic labs, beta hydroxybutyrate, and VBG, provide patient with fluids, and reassess.    Amount and/or Complexity of Data Reviewed  Labs: ordered.  Radiology: ordered.    Risk  Prescription drug management.        ED Course as of 02/08/25 0908   Sat Feb 08, 2025   0623 Mother states that patient had nasal bone surgery in the past, and they are refusing viral panel swab.   0904 Reassessed patient, he is doing better after the fluids, and insulin.  Had extensive discussion with the patient, and mother regarding monitoring blood glucose, and close follow-up with primary care doctor for medication management to which they agreed.  Patient is supposed to be on metformin 500 mg/day, he states that he has this at home.  Patient states that he will take it when he gets home.  Strict return precautions given.  Patient and mother understand and agree with treatment plan.       Medications   sodium chloride 0.9 % bolus 1,000 mL (0 mL Intravenous Stopped 2/8/25 0642)   sodium chloride 0.9 % bolus 1,000 mL (1,000 mL Intravenous New Bag 2/8/25 0642)   insulin lispro (HumALOG/ADMELOG) 100 units/mL subcutaneous injection 5 Units (5  Units Subcutaneous Given 2/8/25 0721)   sodium chloride 0.9 % bolus 1,000 mL (1,000 mL Intravenous New Bag 2/8/25 0804)   insulin lispro (HumALOG/ADMELOG) 100 units/mL subcutaneous injection 5 Units (5 Units Subcutaneous Given 2/8/25 0802)       ED Risk Strat Scores                          SBIRT 22yo+      Flowsheet Row Most Recent Value   Initial Alcohol Screen: US AUDIT-C     1. How often do you have a drink containing alcohol? 0 Filed at: 02/08/2025 0544   2. How many drinks containing alcohol do you have on a typical day you are drinking?  0 Filed at: 02/08/2025 0544   3a. Male UNDER 65: How often do you have five or more drinks on one occasion? 0 Filed at: 02/08/2025 0544   3b. FEMALE Any Age, or MALE 65+: How often do you have 4 or more drinks on one occassion? 0 Filed at: 02/08/2025 0544   Audit-C Score 0 Filed at: 02/08/2025 0544   CHALINO: How many times in the past year have you...    Used an illegal drug or used a prescription medication for non-medical reasons? Never Filed at: 02/08/2025 0544                            History of Present Illness       Chief Complaint   Patient presents with    Hyperglycemia - Symptomatic     Pt's mother reports patient up this am and was acting strange. 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       Past Surgical History:   Procedure Laterality Date    ANKLE SURGERY      BACK SURGERY      CT NEEDLE BIOPSY MEDIASTINUM  04/17/2017    FOOT SURGERY        Family History   Problem Relation Age of Onset    Diabetes type II Mother     Hypertension Mother     Hyperlipidemia Mother     Kidney disease Mother     Diabetes type II Father     Hypertension Father     Hypertension Brother     Stroke Maternal  Grandmother     Diabetes type II Maternal Grandmother     Hypertension Maternal Grandmother     Factor V Leiden deficiency Maternal Grandfather     Rheumatic fever Maternal Grandfather     Kidney disease Maternal Grandfather     Heart attack Maternal Grandfather     Stroke Maternal Grandfather       Social History     Tobacco Use    Smoking status: Never    Smokeless tobacco: Never   Vaping Use    Vaping status: Never Used   Substance Use Topics    Alcohol use: Never    Drug use: Never      E-Cigarette/Vaping    E-Cigarette Use Never User       E-Cigarette/Vaping Substances      I have reviewed and agree with the history as documented.     24-year-old male with past medical history of growth hormone deficiency, diabetes, hyperosmolar hyperglycemic state in the past, history of transaminitis, schwannoma, presents to the emergency department with hyperglycemia.  Patient has been away at college, and came back home to visit mother yesterday.  Patient states that he has been eating and drinking appropriately, he does not take his metformin, or his insulin.  I asked patient why, and he says he does not know.  Patient states that he has had some chills, subjective fevers at home.  Mother stated that he seemed a little confused, and altered at home.  Patient denies any vision changes, focal weakness, changes in sensation, on initial evaluation, patient is alert, oriented to person place time.  He denies any chest pain, shortness of breath.  Patient has had cough, productive of mucus, and sore throat.  He denies any GI or  complaints.  On initial evaluation, patient is tacky to 124.      Hyperglycemia - Symptomatic  Associated symptoms: fever    Associated symptoms: no abdominal pain, no chest pain, no dysuria, no shortness of breath and no vomiting        Review of Systems   Constitutional:  Positive for chills and fever.   HENT:  Positive for sore throat. Negative for ear pain.    Eyes:  Negative for pain and visual  disturbance.   Respiratory:  Positive for cough. Negative for shortness of breath.    Cardiovascular:  Negative for chest pain and palpitations.   Gastrointestinal:  Negative for abdominal pain and vomiting.   Genitourinary:  Negative for dysuria and hematuria.   Musculoskeletal:  Negative for arthralgias and back pain.   Skin:  Negative for color change and rash.   Neurological:  Negative for seizures and syncope.   All other systems reviewed and are negative.          Objective       ED Triage Vitals [02/08/25 0544]   Temperature Pulse Blood Pressure Respirations SpO2 Patient Position - Orthostatic VS   97.7 °F (36.5 °C) (!) 124 125/70 16 95 % Lying      Temp Source Heart Rate Source BP Location FiO2 (%) Pain Score    Temporal Monitor Left arm -- No Pain      Vitals      Date and Time Temp Pulse SpO2 Resp BP Pain Score FACES Pain Rating User   02/08/25 0815 -- 102 97 % 18 107/67 -- --    02/08/25 0800 -- 107 97 % 18 109/70 -- --    02/08/25 0745 -- 111 97 % 18 110/71 -- --    02/08/25 0730 -- 111 98 % 18 115/71 -- --    02/08/25 0615 -- 117 97 % -- -- -- --    02/08/25 0544 97.7 °F (36.5 °C) 124 95 % 16 125/70 No Pain -- AD            Physical Exam  Vitals and nursing note reviewed.   Constitutional:       General: He is not in acute distress.     Appearance: He is well-developed.   HENT:      Head: Normocephalic and atraumatic.   Eyes:      Conjunctiva/sclera: Conjunctivae normal.   Cardiovascular:      Rate and Rhythm: Normal rate and regular rhythm.   Pulmonary:      Effort: Pulmonary effort is normal. No respiratory distress.      Breath sounds: Normal breath sounds.   Abdominal:      Palpations: Abdomen is soft.      Tenderness: There is no abdominal tenderness.   Musculoskeletal:         General: No swelling.      Cervical back: Neck supple.   Skin:     General: Skin is warm and dry.      Capillary Refill: Capillary refill takes less than 2 seconds.   Neurological:      General: No focal deficit  present.      Mental Status: He is alert and oriented to person, place, and time.      Cranial Nerves: No cranial nerve deficit.      Sensory: No sensory deficit.      Motor: No weakness.   Psychiatric:         Mood and Affect: Mood normal.         Results Reviewed       Procedure Component Value Units Date/Time    Fingerstick Glucose (POCT) [[id number]]  (Abnormal) Collected: 02/08/25 0849    Lab Status: Final result Specimen: Blood Updated: 02/08/25 0850     POC Glucose 366 mg/dl     HS Troponin I 4hr [[id number]]     Lab Status: No result Specimen: Blood     Fingerstick Glucose (POCT) [[id number]]  (Abnormal) Collected: 02/08/25 0737    Lab Status: Final result Specimen: Blood Updated: 02/08/25 0737     POC Glucose 419 mg/dl     B-Type Natriuretic Peptide(BNP) [[id number]]  (Normal) Collected: 02/08/25 0555    Lab Status: Final result Specimen: Blood from Arm, Left Updated: 02/08/25 0714     BNP 56 pg/mL     Strep A PCR [[id number]]  (Normal) Collected: 02/08/25 0611    Lab Status: Final result Specimen: Throat Updated: 02/08/25 0644     STREP A PCR Not Detected    HS Troponin I 2hr [[id number]]     Lab Status: No result Specimen: Blood     HS Troponin 0hr (reflex protocol) [[id number]]  (Normal) Collected: 02/08/25 0555    Lab Status: Final result Specimen: Blood from Arm, Left Updated: 02/08/25 0640     hs TnI 0hr 3 ng/L     Urinalysis with microscopic [[id number]]  (Abnormal) Collected: 02/08/25 0623    Lab Status: Final result Specimen: Urine, Clean Catch Updated: 02/08/25 0639     Color, UA Yellow     Clarity, UA Clear     Specific Gravity, UA 1.010     pH, UA 6.0     Leukocytes, UA Elevated glucose may cause decreased leukocyte values. See urine microscopic for UWBC result     Nitrite, UA Negative     Protein, UA Negative mg/dl      Glucose, UA >=1000 (1%) mg/dl      Ketones, UA 15 (1+) mg/dl      Urobilinogen, UA 0.2 E.U./dl      Bilirubin, UA Negative     Occult Blood, UA Negative     RBC, UA None Seen /hpf       WBC, UA 0-1 /hpf      Epithelial Cells Occasional /hpf      Bacteria, UA None Seen /hpf     Comprehensive metabolic panel [[id number]]  (Abnormal) Collected: 02/08/25 0555    Lab Status: Final result Specimen: Blood from Arm, Left Updated: 02/08/25 0628     Sodium 139 mmol/L      Potassium 4.3 mmol/L      Chloride 104 mmol/L      CO2 26 mmol/L      ANION GAP 9 mmol/L      BUN 13 mg/dL      Creatinine 0.93 mg/dL      Glucose 454 mg/dL      Calcium 9.5 mg/dL      AST 90 U/L      ALT 90 U/L      Alkaline Phosphatase 67 U/L      Total Protein 7.6 g/dL      Albumin 4.1 g/dL      Total Bilirubin 0.82 mg/dL      eGFR 116 ml/min/1.73sq m     Narrative:      National Kid[employer] Disease Foundation guidelines for Chronic Kidney Disease (CKD):     Stage 1 with normal or high GFR (GFR > 90 mL/min/1.73 square meters)    Stage 2 Mild CKD (GFR = 60-89 mL/min/1.73 square meters)    Stage 3A Moderate CKD (GFR = 45-59 mL/min/1.73 square meters)    Stage 3B Moderate CKD (GFR = 30-44 mL/min/1.73 square meters)    Stage 4 Severe CKD (GFR = 15-29 mL/min/1.73 square meters)    Stage 5 End Stage CKD (GFR <15 mL/min/1.73 square meters)  Note: GFR calculation is accurate only with a steady state creatinine    Blood gas, venous [[id number]] Collected: 02/08/25 0611    Lab Status: Final result Specimen: Blood from Arm, Left Updated: 02/08/25 0628     pH, Thomas 7.370     pCO2, Thomas 42.7 mm Hg      pO2, Thomas 38.2 mm Hg      HCO3, Thomas 24.1 mmol/L      Base Excess, Thomas -1.2 mmol/L      O2 Content, Thomas 15.7 ml/dL      O2 HGB, VENOUS 72.1 %     Beta Hydroxybutyrate [[id number]]  (Abnormal) Collected: 02/08/25 0555    Lab Status: Final result Specimen: Blood from Arm, Left Updated: 02/08/25 0627     Beta- Hydroxybutyrate 0.96 mmol/L     Lipase [[id number]]  (Normal) Collected: 02/08/25 0555    Lab Status: Final result Specimen: Blood from Arm, Left Updated: 02/08/25 0627     Lipase 38 u/L     CBC and differential [[id number]]  (Abnormal) Collected: 02/08/25 0526     Lab Status: Final result Specimen: Blood from Arm, Left Updated: 02/08/25 0622     WBC 6.71 Thousand/uL      RBC 5.21 Million/uL      Hemoglobin 15.8 g/dL      Hematocrit 48.4 %      MCV 93 fL      MCH 30.3 pg      MCHC 32.6 g/dL      RDW 11.9 %      MPV 10.6 fL      Platelets 179 Thousands/uL      nRBC 0 /100 WBCs      Segmented % 70 %      Immature Grans % 1 %      Lymphocytes % 14 %      Monocytes % 15 %      Eosinophils Relative 0 %      Basophils Relative 0 %      Absolute Neutrophils 4.76 Thousands/µL      Absolute Immature Grans 0.04 Thousand/uL      Absolute Lymphocytes 0.91 Thousands/µL      Absolute Monocytes 0.98 Thousand/µL      Eosinophils Absolute 0.00 Thousand/µL      Basophils Absolute 0.02 Thousands/µL     Fingerstick Glucose (POCT) [[id number]]  (Abnormal) Collected: 02/08/25 0545    Lab Status: Final result Specimen: Blood Updated: 02/08/25 0548     POC Glucose 424 mg/dl             CT head without contrast   Final Interpretation by Jose Estevez MD (02/08 0647)      Stable findings without acute intracranial abnormality.                  Workstation performed: [id number]         XR chest 1 view portable    (Results Pending)       Procedures    ED Medication and Procedure Management   Prior to Admission Medications   Prescriptions Last Dose Informant Patient Reported? Taking?   Alcohol Swabs 70 % PADS   No No   Sig: May substitute brand based on insurance coverage. Check glucose ACHS.   Blood Glucose Monitoring Suppl (OneTouch Verio Reflect) w/Device KIT   No No   Sig: May substitute brand based on insurance coverage. Check glucose ACHS.   Continuous Glucose Sensor (FreeStyle Irina 3 Sensor) MISC   No No   Sig: Use 1 each every 14 (fourteen) days   Insulin Glargine Solostar (Lantus SoloStar) 100 UNIT/ML SOPN   No No   Sig: Inject 0.2 mL (20 Units total) under the skin daily at bedtime   Insulin Pen Needle (BD Pen Needle Danette 2nd Gen) 32G X 4 MM MISC   No No   Sig: For use with insulin pen.  Pharmacy may dispense brand covered by insurance.   Insulin Pen Needle (BD Pen Needle Danette 2nd Gen) 32G X 4 MM MISC   No No   Sig: For use with insulin pen. Pharmacy may dispense brand covered by insurance.   Patient not taking: Reported on 2023   OneTouch Delica Lancets 33G MISC   No No   Sig: May substitute brand based on insurance coverage. Check glucose ACHS.   Testosterone Cypionate 100 MG/ML SOLN   Yes No   Si mg by Intramuscular (multi inj) route every 14 (fourteen) days   Patient not taking: Reported on 3/5/2024   glucose blood (OneTouch Verio) test strip   No No   Sig: Use 1 each 4 (four) times a day May substitute brand based on insurance coverage. Check glucose ACHS.   metFORMIN (GLUCOPHAGE) 500 mg tablet   No No   Sig: Take 1 tablet (500 mg total) by mouth 2 (two) times a day with meals      Facility-Administered Medications: None     Patient's Medications   Discharge Prescriptions    No medications on file       ED SEPSIS DOCUMENTATION   Time reflects when diagnosis was documented in both MDM as applicable and the Disposition within this note       Time User Action Codes Description Comment    2025  9:05 AM Jamar Brito Add [R73.9] Hyperglycemia     2025  9:06 AM Jamar Brito Add [E11.9] Type 2 diabetes mellitus without complication, unspecified whether long term insulin use (HCC)                  Jamar Brito DO  25 [phone]

## 2025-02-08 NOTE — DISCHARGE INSTRUCTIONS
You were seen in the emergency department for hyperglycemia, your CT scan, chest x-ray, and EKG were unremarkable.  Your blood work showed that your blood sugar was elevated, we provided you with fluids, and insulin, and your symptoms improved.  We provided you with an ambulatory referral to family medicine/primary care, they are expecting your phone call to schedule an appointment.  Please call their office to schedule your appointment tomorrow.  If your symptoms worsen or persist please return to the emergency department immediately.  Please take all of your prescribed diabetes medications.  If you have any difficulty getting your prescriptions, or if you are unable to take any of your insulin, or metformin, please return to the emergency department immediately so that we can help facilitate.

## 2025-02-10 LAB
ATRIAL RATE: 115 BPM
P AXIS: 33 DEGREES
PR INTERVAL: 126 MS
QRS AXIS: 48 DEGREES
QRSD INTERVAL: 72 MS
QT INTERVAL: 308 MS
QTC INTERVAL: 426 MS
T WAVE AXIS: 27 DEGREES
VENTRICULAR RATE: 115 BPM

## 2025-02-10 PROCEDURE — 93010 ELECTROCARDIOGRAM REPORT: CPT | Performed by: INTERNAL MEDICINE

## 2025-02-14 ENCOUNTER — OFFICE VISIT (OUTPATIENT)
Dept: FAMILY MEDICINE CLINIC | Facility: CLINIC | Age: 25
End: 2025-02-14
Payer: COMMERCIAL

## 2025-02-14 VITALS
HEIGHT: 72 IN | HEART RATE: 98 BPM | SYSTOLIC BLOOD PRESSURE: 124 MMHG | BODY MASS INDEX: 32.32 KG/M2 | WEIGHT: 238.6 LBS | DIASTOLIC BLOOD PRESSURE: 88 MMHG | OXYGEN SATURATION: 97 %

## 2025-02-14 DIAGNOSIS — Z00.00 ANNUAL PHYSICAL EXAM: Primary | ICD-10-CM

## 2025-02-14 DIAGNOSIS — Z79.4 TYPE 2 DIABETES MELLITUS WITHOUT COMPLICATION, WITH LONG-TERM CURRENT USE OF INSULIN (HCC): ICD-10-CM

## 2025-02-14 DIAGNOSIS — E11.9 TYPE 2 DIABETES MELLITUS WITHOUT COMPLICATION, WITH LONG-TERM CURRENT USE OF INSULIN (HCC): ICD-10-CM

## 2025-02-14 DIAGNOSIS — E11.9 TYPE 2 DIABETES MELLITUS WITHOUT COMPLICATION, UNSPECIFIED WHETHER LONG TERM INSULIN USE (HCC): ICD-10-CM

## 2025-02-14 PROBLEM — J30.2 SEASONAL ALLERGIC RHINITIS: Status: ACTIVE | Noted: 2025-02-14

## 2025-02-14 PROBLEM — Z98.1 S/P LUMBAR FUSION: Status: ACTIVE | Noted: 2017-06-02

## 2025-02-14 LAB
CREAT UR-MCNC: 22.5 MG/DL
MICROALBUMIN UR-MCNC: <7 MG/L
SL AMB POCT HEMOGLOBIN AIC: 12.9 (ref ?–6.5)

## 2025-02-14 PROCEDURE — 99214 OFFICE O/P EST MOD 30 MIN: CPT | Performed by: FAMILY MEDICINE

## 2025-02-14 PROCEDURE — 83036 HEMOGLOBIN GLYCOSYLATED A1C: CPT | Performed by: FAMILY MEDICINE

## 2025-02-14 PROCEDURE — 99385 PREV VISIT NEW AGE 18-39: CPT | Performed by: FAMILY MEDICINE

## 2025-02-14 PROCEDURE — 82570 ASSAY OF URINE CREATININE: CPT | Performed by: FAMILY MEDICINE

## 2025-02-14 PROCEDURE — 82043 UR ALBUMIN QUANTITATIVE: CPT | Performed by: FAMILY MEDICINE

## 2025-02-14 RX ORDER — INSULIN GLARGINE 100 [IU]/ML
INJECTION, SOLUTION SUBCUTANEOUS
Qty: 15 ML | Refills: 2 | Status: SHIPPED | OUTPATIENT
Start: 2025-02-14

## 2025-02-14 NOTE — ASSESSMENT & PLAN NOTE
Increase lantus to 12 u and gave a regimen to follow to keep increasing  Consult clin pharmacy  Check labs  Lab Results   Component Value Date    HGBA1C 12.9 (A) 02/14/2025       Orders:    POCT hemoglobin A1c    Albumin / creatinine urine ratio; Future    Ambulatory Referral to Family Practice    Ambulatory referral to clinical pharmacy; Future    Insulin Glargine Solostar (Lantus SoloStar) 100 UNIT/ML SOPN; 12 units qd    CBC and differential; Future    Comprehensive metabolic panel; Future

## 2025-02-14 NOTE — PROGRESS NOTES
Name: Ted Mayen      : 2000      MRN: [id number]  Encounter Provider: Robert Budinetz, MD  Encounter Date: 2025   Encounter department: [facility]  :  Assessment & Plan  Annual physical exam  Reviewed age-appropriate health maintenance and preventive care.           Type 2 diabetes mellitus without complication, unspecified whether long term insulin use (HCC)  Increase lantus to 12 u and gave a regimen to follow to keep increasing  Consult clin pharmacy  Check labs  Lab Results   Component Value Date    HGBA1C 12.9 (A) 2025       Orders:  •  POCT hemoglobin A1c  •  Albumin / creatinine urine ratio; Future  •  Ambulatory Referral to Family Practice  •  Ambulatory referral to clinical pharmacy; Future  •  Insulin Glargine Solostar (Lantus SoloStar) 100 UNIT/ML SOPN; 12 units qd  •  CBC and differential; Future  •  Comprehensive metabolic panel; Future    Type 2 diabetes mellitus without complication, with long-term current use of insulin (HCC)  Increase lantus to 12 u and gave a regimen to follow to keep increasing  Consult clin pharmacy  Check labs  Lab Results   Component Value Date    HGBA1C 12.9 (A) 2025       Orders:  •  Insulin Glargine Solostar (Lantus SoloStar) 100 UNIT/ML SOPN; 12 units qd  •  CBC and differential; Future  •  Comprehensive metabolic panel; Future           History of Present Illness   The patient is here for his initial in the ER with hyperglycemia he was not taking his metformin or Lantus.  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  He has been following with endocrinology his A1c today is 12.9.  He is back on his meds he is on 10 units of Lantus and can increase that by 2 units every 3 days if his fasting sugars over 140.  He has no active chest pain or shortness of breath.  He does not smoke drink or use drugs.  He is trying to lose weight through diet and exercise.    Review of Systems   Respiratory: Negative.     Cardiovascular:  Negative.        Objective   /88 (BP Location: Left arm, Patient Position: Sitting, Cuff Size: Standard)   Pulse 98   Ht 6' (1.829 m)   Wt 108 kg (238 lb 9.6 oz)   SpO2 97%   BMI 32.36 kg/m²      Physical Exam  Vitals and nursing note reviewed.   Constitutional:       General: He is not in acute distress.     Appearance: He is well-developed.   HENT:      Head: Normocephalic and atraumatic.   Eyes:      Conjunctiva/sclera: Conjunctivae normal.   Cardiovascular:      Rate and Rhythm: Normal rate and regular rhythm.      Heart sounds: No murmur heard.  Pulmonary:      Effort: Pulmonary effort is normal. No respiratory distress.      Breath sounds: Normal breath sounds.   Abdominal:      Palpations: Abdomen is soft.      Tenderness: There is no abdominal tenderness.   Musculoskeletal:         General: No swelling.      Cervical back: Neck supple.   Skin:     General: Skin is warm and dry.      Capillary Refill: Capillary refill takes less than 2 seconds.   Neurological:      Mental Status: He is alert.   Psychiatric:         Mood and Affect: Mood normal.

## 2025-02-14 NOTE — ASSESSMENT & PLAN NOTE
Increase lantus to 12 u and gave a regimen to follow to keep increasing  Consult clin pharmacy  Check labs  Lab Results   Component Value Date    HGBA1C 12.9 (A) 02/14/2025       Orders:    Insulin Glargine Solostar (Lantus SoloStar) 100 UNIT/ML SOPN; 12 units qd    CBC and differential; Future    Comprehensive metabolic panel; Future

## 2025-02-19 ENCOUNTER — TELEPHONE (OUTPATIENT)
Dept: FAMILY MEDICINE CLINIC | Facility: CLINIC | Age: 25
End: 2025-02-19

## 2025-02-19 ENCOUNTER — TELEPHONE (OUTPATIENT)
Age: 25
End: 2025-02-19

## 2025-02-19 NOTE — TELEPHONE ENCOUNTER
PA for Estuardo Walshar 100UNIT/ NOT REQUIRED     Reason (screenshot if applicable)          Patient advised by          [x] MyChart Message  [] Phone call   []LMOM  []L/M to call office as no active Communication consent on file  []Unable to leave detailed message as VM not approved on Communication consent       Pharmacy advised by    []Fax  []Phone call

## 2025-02-19 NOTE — TELEPHONE ENCOUNTER
PA for Estuardo Silvestre 100UNIT/SUBMITTED to [employer]    via    [x]CMM-KEY:  (Key: [id number])  []Surescripts-Case ID #    []Availity-Auth ID #  NDC #    []Faxed to plan   []Other website    []Phone call Case ID #      [x]PA sent as URGENT    All office notes, labs and other pertaining documents and studies sent. Clinical questions answered. Awaiting determination from insurance company.     Turnaround time for your insurance to make a decision on your Prior Authorization can take 7-21 business days.                (Key: [id number])

## 2025-02-19 NOTE — TELEPHONE ENCOUNTER
Please contact patient to schedule Clinical Pharmacist Appointment     Reason for appointment: diabetes  When to schedule with Pharmacist: as soon as available  What should the patient bring to the appointment: med list and glucose log    Appointment Department:  [facility]  Pharmacist patient will be seeing: Divya Jackson  Visit Type Preference (ie Phone, Video, In-person): no preference   In-person visits will be at: [facility]  Virtual visits will be completed via [employer] or Phone - please clarify patient preference in appointment notes    Please respond to this note to keep track of the number of patient outreaches.     Please try to reach out to patient on 2 separate days

## 2025-02-24 NOTE — PROGRESS NOTES
[facility] Clinical Pharmacy Services  Divya Jackson    Administrative Statements   Encounter provider Divya Jackson    The Patient is located at Other and in the following state in which I hold an active license [place].    The patient was identified by name and date of birth. Ted Mayen was informed that this is a telemedicine visit and that the visit is being conducted through the Epic Embedded platform. He agrees to proceed..  My office door was closed. No one else was in the room.  He acknowledged consent and understanding of privacy and security of the video platform. The patient has agreed to participate and understands they can discontinue the visit at any time.    Assessment/ Plan     Assessment & Plan  Type 2 diabetes mellitus with hyperglycemia, without long-term current use of insulin ([facility])    Lab Results   Component Value Date    HGBA1C 12.9 (A) 02/14/2025     Goal A1c <6.5% .   On Additional Therapies:  Statin: no- not indication for primary prevention at this time due to age <39 yo  ACEI/ARB: no    Assessment:  A1c above goal and glucose readings remain elevated.  He would benefit from GLP-1 containing medication for both glucose control and continued weight loss.  Educated patient on mechanism of action, potential side effects (N/V/D, constipation, headaches, increased HR), warning symptoms (severe upper abdominal pain or radiating pain towards back, severe N/V, medication storage, administration, and dosing schedule.  No history of pancreatitis.  No personal or family history of medullary thyroid cancer multiple endocrine neoplasia type II    Medication Regimen:  Dexcom G7: Initiate Dexcom G7 sensors   Ozempic: Initiate Ozempic 0.25 mg weekly   Lantus: Continue 18 units daily; titrate by 2 units every 3 days until fasting glucose < 140  Metformin: Continue 500 mg twice daily    Orders:    semaglutide, 0.25 or 0.5 mg/dose, (Ozempic, 0.25 or 0.5 MG/DOSE,) 2 mg/3 mL injection  "pen; 0.25 mg under the skin every 7 days for 4 doses (28 days), THEN 0.5 mg under the skin every 7 days    Continuous Glucose Sensor (Dexcom G7 Sensor); Use 1 Device every 10 days      Follow-up: 4 weeks     Subjective   Appointment today is to address type 2 diabetes.  Earlier this month he was in a ER with hyperglycemia and he was not taking his metformin or Lantus. Stopped due to psychosocial barriers and compliance.  He was following with endocrinology but last visit was over a year ago. He is back on his meds he is on 18 units of Lantus and can increase that by 2 units every 3 days if his fasting sugars over 140.  He is trying to lose weight through diet and exercise.        Medication Adherence/ Tolerability/ Cost:  Patient denies side effects, no issues with cost, 0 missed doses in last two week  Alcohol Swabs Pads  BD Pen Needle Danette 2nd Gen Misc  FreeStyle Irina 3 Sensor Misc  Insulin Glargine Solostar Sopn- 18 units   metFORMIN 500 mg BID with food   OneTouch Delica Lancets 33G Misc  OneTouch Verio Reflect Kit  OneTouch Verio Strp    Previous Medications      Review of Systems   Gastrointestinal: Negative.    Endocrine: Negative.         2. Lifestyle:   He is working on diet and lifestyle interventions including limiting portions choosing better options  Down 70 pounds.     3. Home monitoring devices  Glucometer: Yes, Brand: One touch verio reflect   Continuous Glucose Monitor: No, Brand:     Objective       Blood Sugar Readings  The patient is currently checking blood glucose 2-3 times per day. Patient does not reports with SMBG logs.    In -230 mg/dL   Later in the day 250 mg/dL      ASCVD Risk:  The ASCVD Risk score (Carmela DK, et al., 2019) failed to calculate for the following reasons:    The 2019 ASCVD risk score is only valid for ages 40 to 79     Vitals:  There were no vitals filed for this visit.    Eye Exam:    No results found for: \"LEFTDIABRET\", \"RIGHTDIABRET\"    Labs:  Lab Results "   Component Value Date    SODIUM 139 02/08/2025    K 4.3 02/08/2025    EGFR 116 02/08/2025    CREATININE 0.93 02/08/2025    GLUF 105 (H) 09/07/2023    MICROALBCRE  02/14/2025      Comment:      Unable to calculate.       Lab Results   Component Value Date    HGBA1C 12.9 (A) 02/14/2025    HGBA1C 8.8 (H) 07/03/2023    HGBA1C 6.3 (H) 04/08/2021         Pharmacist Tracking Tool     Pharmacist Tracking Tool  Reason For Outreach: Embedded Pharmacist  Demographics:  Intervention Method: Video  Type of Intervention: New  Topics Addressed: Diabetes  Pharmacologic Interventions: Medication Initiation and Med Rec  Non-Pharmacologic Interventions: Disease state education, Home Monitoring, Medication/Device education, and Personal CGM  Time:  Direct Patient Care:  25  mins  Care Coordination:  15  mins  Recommendation Recipient: Patient/Caregiver and Provider  Outcome: Accepted

## 2025-02-25 ENCOUNTER — TELEMEDICINE (OUTPATIENT)
Dept: FAMILY MEDICINE CLINIC | Facility: CLINIC | Age: 25
End: 2025-02-25

## 2025-02-25 DIAGNOSIS — E11.65 TYPE 2 DIABETES MELLITUS WITH HYPERGLYCEMIA, WITHOUT LONG-TERM CURRENT USE OF INSULIN (HCC): Primary | ICD-10-CM

## 2025-02-25 DIAGNOSIS — E11.9 TYPE 2 DIABETES MELLITUS WITHOUT COMPLICATION, UNSPECIFIED WHETHER LONG TERM INSULIN USE (HCC): ICD-10-CM

## 2025-02-25 PROCEDURE — PBNCHG PB NO CHARGE PLACEHOLDER: Performed by: PHARMACIST

## 2025-02-25 RX ORDER — ACYCLOVIR 400 MG/1
1 TABLET ORAL
Qty: 9 EACH | Refills: 3 | Status: SHIPPED | OUTPATIENT
Start: 2025-02-25

## 2025-02-25 NOTE — ASSESSMENT & PLAN NOTE
Lab Results   Component Value Date    HGBA1C 12.9 (A) 02/14/2025     Goal A1c <6.5% .   On Additional Therapies:  Statin: no- not indication for primary prevention at this time due to age <39 yo  ACEI/ARB: no    Assessment:  A1c above goal and glucose readings remain elevated.  He would benefit from GLP-1 containing medication for both glucose control and continued weight loss.  Educated patient on mechanism of action, potential side effects (N/V/D, constipation, headaches, increased HR), warning symptoms (severe upper abdominal pain or radiating pain towards back, severe N/V, medication storage, administration, and dosing schedule.  No history of pancreatitis.  No personal or family history of medullary thyroid cancer multiple endocrine neoplasia type II    Medication Regimen:  Dexcom G7: Initiate Dexcom G7 sensors   Ozempic: Initiate Ozempic 0.25 mg weekly   Lantus: Continue 18 units daily; titrate by 2 units every 3 days until fasting glucose < 140  Metformin: Continue 500 mg twice daily    Orders:    semaglutide, 0.25 or 0.5 mg/dose, (Ozempic, 0.25 or 0.5 MG/DOSE,) 2 mg/3 mL injection pen; 0.25 mg under the skin every 7 days for 4 doses (28 days), THEN 0.5 mg under the skin every 7 days    Continuous Glucose Sensor (Dexcom G7 Sensor); Use 1 Device every 10 days

## 2025-03-10 ENCOUNTER — TELEPHONE (OUTPATIENT)
Dept: ADMINISTRATIVE | Facility: OTHER | Age: 25
End: 2025-03-10

## 2025-03-10 NOTE — TELEPHONE ENCOUNTER
----- Message from Dina MONTALVO sent at 3/10/2025 11:01 AM EDT -----  Regarding: Care Gap request  03/10/25 11:01 AM    Hello, our patient attached above has had Diabetic Eye Exam completed/performed. Please assist in updating the patient chart by pulling the document from the Media Tab. The date of service is 6/2/2023.     Thank you,  Dina Guzman  [facility]

## 2025-03-10 NOTE — TELEPHONE ENCOUNTER
Upon review of the In Basket request we were able to locate, review, and update the patient chart as requested for Diabetic Eye Exam.    Any additional questions or concerns should be emailed to the Practice Liaisons via the appropriate education email address, please do not reply via In Basket.    Thank you  ANGELINA ZHENG MA   [facility]

## 2025-03-14 ENCOUNTER — OFFICE VISIT (OUTPATIENT)
Dept: FAMILY MEDICINE CLINIC | Facility: CLINIC | Age: 25
End: 2025-03-14
Payer: COMMERCIAL

## 2025-03-14 VITALS
HEART RATE: 98 BPM | WEIGHT: 242 LBS | BODY MASS INDEX: 32.78 KG/M2 | HEIGHT: 72 IN | OXYGEN SATURATION: 100 % | DIASTOLIC BLOOD PRESSURE: 68 MMHG | SYSTOLIC BLOOD PRESSURE: 108 MMHG

## 2025-03-14 DIAGNOSIS — E11.9 TYPE 2 DIABETES MELLITUS WITHOUT COMPLICATION, UNSPECIFIED WHETHER LONG TERM INSULIN USE (HCC): ICD-10-CM

## 2025-03-14 DIAGNOSIS — E11.65 TYPE 2 DIABETES MELLITUS WITH HYPERGLYCEMIA, WITHOUT LONG-TERM CURRENT USE OF INSULIN (HCC): Primary | ICD-10-CM

## 2025-03-14 PROCEDURE — 99214 OFFICE O/P EST MOD 30 MIN: CPT | Performed by: FAMILY MEDICINE

## 2025-03-14 NOTE — ASSESSMENT & PLAN NOTE
Lab Results   Component Value Date    HGBA1C 12.9 (A) 02/14/2025       Orders:    metFORMIN (GLUCOPHAGE) 500 mg tablet; Take 1 tablet (500 mg total) by mouth 2 (two) times a day with meals

## 2025-03-14 NOTE — ASSESSMENT & PLAN NOTE
Increase ozempic to 0.5mg  Lab Results   Component Value Date    HGBA1C 12.9 (A) 02/14/2025       Orders:    semaglutide, 0.25 or 0.5 mg/dose, (Ozempic, 0.25 or 0.5 MG/DOSE,) 2 mg/3 mL injection pen; Inject 0.75 mL (0.5 mg total) under the skin every 7 days

## 2025-03-14 NOTE — PROGRESS NOTES
Name: Ted Mayen      : 2000      MRN: [id number]  Encounter Provider: Robert Budinetz, MD  Encounter Date: 3/14/2025   Encounter department: [facility] PR[facility]  :  Assessment & Plan  Type 2 diabetes mellitus with hyperglycemia, without long-term current use of insulin (HCC)  Increase ozempic to 0.5mg  Lab Results   Component Value Date    HGBA1C 12.9 (A) 2025       Orders:    semaglutide, 0.25 or 0.5 mg/dose, (Ozempic, 0.25 or 0.5 MG/DOSE,) 2 mg/3 mL injection pen; Inject 0.75 mL (0.5 mg total) under the skin every 7 days    Type 2 diabetes mellitus without complication, unspecified whether long term insulin use (HCC)    Lab Results   Component Value Date    HGBA1C 12.9 (A) 2025       Orders:    metFORMIN (GLUCOPHAGE) 500 mg tablet; Take 1 tablet (500 mg total) by mouth 2 (two) times a day with meals           History of Present Illness   The patient is here to follow-up on his diabetes.  He is up to 22 units of Lantus he is also on Ozempic, can increase the dose today.  He is tolerating everything without any hypoglycemic events.  He feels well and has no new concerns at this time.  He did speak with Aleena from clinical pharmacy and is doing very well overall.      Review of Systems   Respiratory: Negative.     Cardiovascular: Negative.    Gastrointestinal: Negative.        Objective   /68 (BP Location: Left arm, Patient Position: Sitting, Cuff Size: Standard)   Pulse 98   Ht 6' (1.829 m)   Wt 110 kg (242 lb)   SpO2 100%   BMI 32.82 kg/m²      Physical Exam  Vitals and nursing note reviewed.   Constitutional:       General: He is not in acute distress.     Appearance: He is well-developed.   HENT:      Head: Normocephalic and atraumatic.   Eyes:      Conjunctiva/sclera: Conjunctivae normal.   Cardiovascular:      Rate and Rhythm: Normal rate and regular rhythm.      Heart sounds: No murmur heard.  Pulmonary:      Effort: Pulmonary effort is normal. No respiratory  distress.      Breath sounds: Normal breath sounds.   Abdominal:      Palpations: Abdomen is soft.      Tenderness: There is no abdominal tenderness.   Musculoskeletal:         General: No swelling.      Cervical back: Neck supple.   Skin:     General: Skin is warm and dry.      Capillary Refill: Capillary refill takes less than 2 seconds.   Neurological:      Mental Status: He is alert.   Psychiatric:         Mood and Affect: Mood normal.

## 2025-03-24 NOTE — PROGRESS NOTES
[facility] Clinical Pharmacy Services  Divya Jackson    Administrative Statements   Encounter provider Divya Jackson    The Patient is located at Other and in the following state in which I hold an active license [place].    The patient was identified by name and date of birth. Ted Mayen was informed that this is a telemedicine visit and that the visit is being conducted through the Epic Embedded platform. He agrees to proceed..  My office door was closed. No one else was in the room.  He acknowledged consent and understanding of privacy and security of the video platform. The patient has agreed to participate and understands they can discontinue the visit at any time.    Assessment/ Plan     Assessment & Plan  Type 2 diabetes mellitus with hyperglycemia, without long-term current use of insulin ([facility])    Lab Results   Component Value Date    HGBA1C 12.9 (A) 02/14/2025     Goal A1c <6.5% .   On Additional Therapies:  Statin: no- not indication for primary prevention at this time due to age <39 yo  ACEI/ARB: no  Glucose control on CGM report  Within goal range of 70 - 180 mg/dL 33% of time (goal >70% of time)  Average glucose 200 mg/dL (goal <154 mg/dL)  GMI 8.1%     Assessment:  Glucose control on Dexcom report is improving.  He is tolerating Ozempic 0.25 mg weekly without any side effects.  He plans to increase this Sunday.    Medication Regimen:  Ozempic: Increase Ozempic 0.5 mg weekly on 4/30  Lantus: Continue 24 units daily  Metformin: Continue 500 mg twice daily           Follow-up: 4 weeks     Subjective   Appointment today is to address type 2 diabetes.  Earlier this month he was in a ER with hyperglycemia and he was not taking his metformin or Lantus. Stopped due to psychosocial barriers and compliance.  He was following with endocrinology but last visit was over a year ago. He is back on his meds he is on 18 units of Lantus and can increase that by 2 units every 3 days if his  fasting sugars over 140.  He is trying to lose weight through diet and exercise.  Last visit he was started on Ozempic and Dexcom CGM.        Medication Adherence/ Tolerability/ Cost:  Patient denies side effects, no issues with cost, 0 missed doses in last two week  Alcohol Swabs Pads  BD Pen Needle Danette 2nd Gen Misc  FreeStyle Irina 3 Sensor Misc  Insulin Glargine Solostar Sopn- 24 units daily   metFORMIN 500 mg BID with food   OneTouch Delica Lancets 33G Misc  OneTouch Verio Reflect Kit  OneTouch Verio Strp  Ozempic- 4 doses of 0.25 mg weekly. No side effects so far.     Previous Medications      Review of Systems   Gastrointestinal: Negative.  Negative for abdominal pain, constipation, diarrhea, nausea and vomiting.   Endocrine: Negative.         2. Lifestyle:   He is working on diet and lifestyle interventions including limiting portions choosing better options  Down 70 pounds.     3. Home monitoring devices  Glucometer: Yes, Brand: One touch verio reflect   Continuous Glucose Monitor: No, Brand:     Objective         ASCVD Risk:  The ASCVD Risk score (Carmela COHEN, et al., 2019) failed to calculate for the following reasons:    The 2019 ASCVD risk score is only valid for ages 40 to 79     Vitals:  There were no vitals filed for this visit.    Eye Exam:    Lab Results   Component Value Date    LEFTDIABRET None 06/02/2023    RIGHTDIABRET None 06/02/2023       Labs:  Lab Results   Component Value Date    SODIUM 139 02/08/2025    K 4.3 02/08/2025    EGFR 116 02/08/2025    CREATININE 0.93 02/08/2025    GLUF 105 (H) 09/07/2023    MICROALBCRE  02/14/2025      Comment:      Unable to calculate.       Lab Results   Component Value Date    HGBA1C 12.9 (A) 02/14/2025    HGBA1C 8.8 (H) 07/03/2023    HGBA1C 6.3 (H) 04/08/2021         Pharmacist Tracking Tool     Pharmacist Tracking Tool  Reason For Outreach: Embedded Pharmacist  Demographics:  Intervention Method: Phone  Type of Intervention: Follow-Up  Topics Addressed:  Diabetes  Pharmacologic Interventions: Dose or Frequency Adjusted  Non-Pharmacologic Interventions: Disease state education, Home Monitoring, Medication/Device education, and Personal CGM  Time:  Direct Patient Care:  15  mins  Care Coordination:  10  mins  Recommendation Recipient: Patient/Caregiver and Provider  Outcome: Accepted

## 2025-03-24 NOTE — ASSESSMENT & PLAN NOTE
Lab Results   Component Value Date    HGBA1C 12.9 (A) 02/14/2025     Goal A1c <6.5% .   On Additional Therapies:  Statin: no- not indication for primary prevention at this time due to age <41 yo  ACEI/ARB: no  Glucose control on CGM report  Within goal range of 70 - 180 mg/dL 33% of time (goal >70% of time)  Average glucose 200 mg/dL (goal <154 mg/dL)  GMI 8.1%     Assessment:  Glucose control on Dexcom report is improving.  He is tolerating Ozempic 0.25 mg weekly without any side effects.  He plans to increase this Sunday.    Medication Regimen:  Ozempic: Increase Ozempic 0.5 mg weekly on 4/30  Lantus: Continue 24 units daily  Metformin: Continue 500 mg twice daily

## 2025-03-25 ENCOUNTER — TELEMEDICINE (OUTPATIENT)
Dept: FAMILY MEDICINE CLINIC | Facility: CLINIC | Age: 25
End: 2025-03-25

## 2025-03-25 DIAGNOSIS — E11.9 TYPE 2 DIABETES MELLITUS WITHOUT COMPLICATION, UNSPECIFIED WHETHER LONG TERM INSULIN USE (HCC): ICD-10-CM

## 2025-03-25 DIAGNOSIS — E11.9 TYPE 2 DIABETES MELLITUS WITHOUT COMPLICATION, WITH LONG-TERM CURRENT USE OF INSULIN (HCC): ICD-10-CM

## 2025-03-25 DIAGNOSIS — E11.65 TYPE 2 DIABETES MELLITUS WITH HYPERGLYCEMIA, WITHOUT LONG-TERM CURRENT USE OF INSULIN (HCC): Primary | ICD-10-CM

## 2025-03-25 DIAGNOSIS — Z79.4 TYPE 2 DIABETES MELLITUS WITHOUT COMPLICATION, WITH LONG-TERM CURRENT USE OF INSULIN (HCC): ICD-10-CM

## 2025-03-25 PROCEDURE — PBNCHG PB NO CHARGE PLACEHOLDER: Performed by: PHARMACIST

## 2025-03-25 RX ORDER — INSULIN GLARGINE 100 [IU]/ML
24 INJECTION, SOLUTION SUBCUTANEOUS DAILY
Qty: 15 ML | Refills: 3 | Status: SHIPPED | OUTPATIENT
Start: 2025-03-25

## 2025-04-06 DIAGNOSIS — E11.65 TYPE 2 DIABETES MELLITUS WITH HYPERGLYCEMIA, WITHOUT LONG-TERM CURRENT USE OF INSULIN (HCC): ICD-10-CM

## 2025-04-21 NOTE — ASSESSMENT & PLAN NOTE
Lab Results   Component Value Date    HGBA1C 12.9 (A) 02/14/2025     Goal A1c <6.5% .   On Additional Therapies:  Statin: no- not indication for primary prevention at this time due to age <39 yo  ACEI/ARB: no  Glucose control on CGM report  Within goal range of 70 - 180 mg/dL 64% of time (goal >70% of time)  Average glucose 174 mg/dL (goal <154 mg/dL)  GMI 7.5%      Assessment:  Glucose control on Dexcom report is improving.  He is tolerating Ozempic 0.5 mg weekly without any side effects.  Goal to maximize Ozempic and minimize insulin doses.      Medication Regimen:  Ozempic: Increase Ozempic to 1 mg weekly on 5/11  Lantus: Continue 24 units daily. If any glucose readings < 70 mg/dL then decrease by 2- 4 units  Metformin: Continue 500 mg twice daily    Orders:    semaglutide, 1 mg/dose, (Ozempic) 4 mg/3 mL injection pen; Inject 0.75 mL (1 mg total) under the skin once a week

## 2025-04-21 NOTE — PROGRESS NOTES
[facility] Clinical Pharmacy Services  Divay Jackson    Administrative Statements   Encounter provider Divya Jackson    The Patient is located at Other and in the following state in which I hold an active license [place].    The patient was identified by name and date of birth. Ted Mayen was informed that this is a telemedicine visit and that the visit is being conducted through the Epic Embedded platform. He agrees to proceed..  My office door was closed. No one else was in the room.  He acknowledged consent and understanding of privacy and security of the video platform. The patient has agreed to participate and understands they can discontinue the visit at any time.    Assessment/ Plan     Assessment & Plan  Type 2 diabetes mellitus with hyperglycemia, without long-term current use of insulin ([facility])    Lab Results   Component Value Date    HGBA1C 12.9 (A) 02/14/2025     Goal A1c <6.5% .   On Additional Therapies:  Statin: no- not indication for primary prevention at this time due to age <39 yo  ACEI/ARB: no  Glucose control on CGM report  Within goal range of 70 - 180 mg/dL 64% of time (goal >70% of time)  Average glucose 174 mg/dL (goal <154 mg/dL)  GMI 7.5%      Assessment:  Glucose control on Dexcom report is improving.  He is tolerating Ozempic 0.5 mg weekly without any side effects.  Goal to maximize Ozempic and minimize insulin doses.      Medication Regimen:  Ozempic: Increase Ozempic to 1 mg weekly on 5/11  Lantus: Continue 24 units daily. If any glucose readings < 70 mg/dL then decrease by 2- 4 units  Metformin: Continue 500 mg twice daily    Orders:    semaglutide, 1 mg/dose, (Ozempic) 4 mg/3 mL injection pen; Inject 0.75 mL (1 mg total) under the skin once a week      Follow-up: 5 weeks     Subjective   Appointment today is to address type 2 diabetes.  Earlier this month he was in a ER with hyperglycemia and he was not taking his metformin or Lantus. Stopped due to  psychosocial barriers and compliance.  Medication regimen includes metformin, Lantus, and Ozempic. He is trying to lose weight through diet and exercise.  Last visit his Ozempic was increased to 0.5 mg weekly.         Medication Adherence/ Tolerability/ Cost:  Patient denies side effects, no issues with cost, 0 missed doses in last two week  Alcohol Swabs Pads  BD Pen Needle Danette 2nd Gen Misc  FreeStyle Irina 3 Sensor Misc  Insulin Glargine Solostar Sopn- 24 units daily   metFORMIN 500 mg BID with food   OneTouch Delica Lancets 33G Misc  OneTouch Verio Reflect Kit  OneTouch Verio Strp  Ozempic- 0.5 mg weekly - no side effects so far.     Previous Medications      Review of Systems   Gastrointestinal: Negative.  Negative for abdominal pain, constipation, diarrhea, nausea and vomiting.   Endocrine: Negative.         2. Lifestyle:   He is working on diet and lifestyle interventions including limiting portions choosing better options  Down 70 pounds.     3. Home monitoring devices  Glucometer: Yes, Brand: One touch verio reflect   Continuous Glucose Monitor: No, Brand:     Objective         ASCVD Risk:  The ASCVD Risk score (Carmela DK, et al., 2019) failed to calculate for the following reasons:    The 2019 ASCVD risk score is only valid for ages 40 to 79     Vitals:  There were no vitals filed for this visit.    Eye Exam:    Lab Results   Component Value Date    LEFTDIABRET None 06/02/2023    RIGHTDIABRET None 06/02/2023       Labs:  Lab Results   Component Value Date    SODIUM 139 02/08/2025    K 4.3 02/08/2025    EGFR 116 02/08/2025    CREATININE 0.93 02/08/2025    GLUF 105 (H) 09/07/2023    MICROALBCRE  02/14/2025      Comment:      Unable to calculate.       Lab Results   Component Value Date    HGBA1C 12.9 (A) 02/14/2025    HGBA1C 8.8 (H) 07/03/2023    HGBA1C 6.3 (H) 04/08/2021         Pharmacist Tracking Tool     Pharmacist Tracking Tool  Reason For Outreach: Embedded Pharmacist  Demographics:  Intervention  Method: Phone  Type of Intervention: Follow-Up  Topics Addressed: Diabetes  Pharmacologic Interventions: Dose or Frequency Adjusted  Non-Pharmacologic Interventions: Disease state education, Home Monitoring, Medication/Device education, and Personal CGM  Time:  Direct Patient Care:  15  mins  Care Coordination:  10  mins  Recommendation Recipient: Patient/Caregiver and Provider  Outcome: Accepted

## 2025-04-22 ENCOUNTER — TELEMEDICINE (OUTPATIENT)
Dept: FAMILY MEDICINE CLINIC | Facility: CLINIC | Age: 25
End: 2025-04-22

## 2025-04-22 DIAGNOSIS — E11.65 TYPE 2 DIABETES MELLITUS WITH HYPERGLYCEMIA, WITHOUT LONG-TERM CURRENT USE OF INSULIN (HCC): Primary | ICD-10-CM

## 2025-04-22 PROCEDURE — PBNCHG PB NO CHARGE PLACEHOLDER: Performed by: PHARMACIST

## 2025-04-22 NOTE — PATIENT INSTRUCTIONS
Ozempic: Increase Ozempic to 1 mg weekly on 5/11  Lantus: Continue 24 units daily. If any glucose readings < 70 mg/dL then decrease by 2- 4 units  Metformin: Continue 500 mg twice daily

## 2025-04-23 DIAGNOSIS — E11.9 TYPE 2 DIABETES MELLITUS WITHOUT COMPLICATION, UNSPECIFIED WHETHER LONG TERM INSULIN USE (HCC): ICD-10-CM

## 2025-05-21 DIAGNOSIS — E11.9 TYPE 2 DIABETES MELLITUS WITHOUT COMPLICATION, UNSPECIFIED WHETHER LONG TERM INSULIN USE (HCC): ICD-10-CM

## 2025-05-22 ENCOUNTER — OFFICE VISIT (OUTPATIENT)
Dept: FAMILY MEDICINE CLINIC | Facility: CLINIC | Age: 25
End: 2025-05-22
Payer: COMMERCIAL

## 2025-05-22 VITALS
HEART RATE: 89 BPM | OXYGEN SATURATION: 99 % | HEIGHT: 72 IN | WEIGHT: 228 LBS | DIASTOLIC BLOOD PRESSURE: 75 MMHG | SYSTOLIC BLOOD PRESSURE: 108 MMHG | BODY MASS INDEX: 30.88 KG/M2

## 2025-05-22 DIAGNOSIS — Z79.4 TYPE 2 DIABETES MELLITUS WITHOUT COMPLICATION, WITH LONG-TERM CURRENT USE OF INSULIN (HCC): ICD-10-CM

## 2025-05-22 DIAGNOSIS — E11.9 TYPE 2 DIABETES MELLITUS WITHOUT COMPLICATION, WITH LONG-TERM CURRENT USE OF INSULIN (HCC): ICD-10-CM

## 2025-05-22 DIAGNOSIS — E11.9 TYPE 2 DIABETES MELLITUS WITHOUT COMPLICATION, UNSPECIFIED WHETHER LONG TERM INSULIN USE (HCC): ICD-10-CM

## 2025-05-22 PROCEDURE — 99214 OFFICE O/P EST MOD 30 MIN: CPT | Performed by: FAMILY MEDICINE

## 2025-05-22 PROCEDURE — 83036 HEMOGLOBIN GLYCOSYLATED A1C: CPT | Performed by: FAMILY MEDICINE

## 2025-05-22 RX ORDER — INSULIN GLARGINE 100 [IU]/ML
10 INJECTION, SOLUTION SUBCUTANEOUS DAILY
Start: 2025-05-22

## 2025-05-22 NOTE — ASSESSMENT & PLAN NOTE
A1c in 2/25 was 12.9 and is 5.9 today  Will continue metformin 500mg bid and ozempic at 1mg weekly  Decrease lantus from 24u to 10 u  Lab Results   Component Value Date    HGBA1C 12.9 (A) 02/14/2025       Orders:    POCT hemoglobin A1c    Insulin Glargine Solostar (Lantus SoloStar) 100 UNIT/ML SOPN; Inject 0.1 mL (10 Units total) under the skin daily

## 2025-05-22 NOTE — ASSESSMENT & PLAN NOTE
A1c in 2/25 was 12.9 and is 5.9 today  Will continue metformin 500mg bid and ozempic at 1mg weekly  Decrease lantus from 24u to 10 u  Lab Results   Component Value Date    HGBA1C 12.9 (A) 02/14/2025       Orders:    POCT hemoglobin A1c    Insulin Glargine Solostar (Lantus SoloStar) 100 UNIT/ML SOPN; Inject 0.1 mL (10 Units total) under the skin daily    metFORMIN (GLUCOPHAGE) 500 mg tablet; Take 1 tablet (500 mg total) by mouth 2 (two) times a day with meals

## 2025-05-22 NOTE — PROGRESS NOTES
Name: Ted Mayen      : 2000      MRN: [id number]  Encounter Provider: Robert Budinetz, MD  Encounter Date: 2025   Encounter department: [facility]  :  Assessment & Plan  Type 2 diabetes mellitus without complication, unspecified whether long term insulin use (HCC)  A1c in  was 12.9 and is 5.9 today  Will continue metformin 500mg bid and ozempic at 1mg weekly  Decrease lantus from 24u to 10 u  Lab Results   Component Value Date    HGBA1C 12.9 (A) 2025       Orders:    POCT hemoglobin A1c    Insulin Glargine Solostar (Lantus SoloStar) 100 UNIT/ML SOPN; Inject 0.1 mL (10 Units total) under the skin daily    metFORMIN (GLUCOPHAGE) 500 mg tablet; Take 1 tablet (500 mg total) by mouth 2 (two) times a day with meals    Type 2 diabetes mellitus without complication, with long-term current use of insulin (HCC)  A1c in  was 12.9 and is 5.9 today  Will continue metformin 500mg bid and ozempic at 1mg weekly  Decrease lantus from 24u to 10 u  Lab Results   Component Value Date    HGBA1C 12.9 (A) 2025       Orders:    POCT hemoglobin A1c    Insulin Glargine Solostar (Lantus SoloStar) 100 UNIT/ML SOPN; Inject 0.1 mL (10 Units total) under the skin daily           History of Present Illness   Is here for follow-up.  He is doing great he lost a lot of weight he feels well he is exercising and working.  He is on metformin 500 twice a day Ozempic 1 mg weekly and Lantus 24 units at night.  In February his A1c was 24 and this was after an admission for hyperglycemia.  He is very compliant with his regimen his A1c today is 5.9.  Everything is great I am concerned that he is going to potentially have hypoglycemic events if we do not decrease his insulin we can go from 24 to 10 units and continue with the Ozempic and the metformin.      Review of Systems   Respiratory: Negative.     Cardiovascular: Negative.    Gastrointestinal: Negative.        Objective   /75 (BP  Location: Left arm, Patient Position: Sitting, Cuff Size: Large)   Pulse 89   Ht 6' (1.829 m)   Wt 103 kg (228 lb)   SpO2 99%   BMI 30.92 kg/m²      Physical Exam  Vitals and nursing note reviewed.   Constitutional:       General: He is not in acute distress.     Appearance: He is well-developed.   HENT:      Head: Normocephalic and atraumatic.     Eyes:      Conjunctiva/sclera: Conjunctivae normal.       Cardiovascular:      Rate and Rhythm: Normal rate and regular rhythm.      Heart sounds: No murmur heard.  Pulmonary:      Effort: Pulmonary effort is normal. No respiratory distress.      Breath sounds: Normal breath sounds.   Abdominal:      Palpations: Abdomen is soft.      Tenderness: There is no abdominal tenderness.     Musculoskeletal:         General: No swelling.      Cervical back: Neck supple.     Skin:     General: Skin is warm and dry.      Capillary Refill: Capillary refill takes less than 2 seconds.     Neurological:      Mental Status: He is alert.     Psychiatric:         Mood and Affect: Mood normal.

## 2025-05-27 ENCOUNTER — TELEMEDICINE (OUTPATIENT)
Dept: FAMILY MEDICINE CLINIC | Facility: CLINIC | Age: 25
End: 2025-05-27

## 2025-05-27 DIAGNOSIS — E11.9 TYPE 2 DIABETES MELLITUS WITHOUT COMPLICATION, WITH LONG-TERM CURRENT USE OF INSULIN (HCC): ICD-10-CM

## 2025-05-27 DIAGNOSIS — Z79.4 TYPE 2 DIABETES MELLITUS WITHOUT COMPLICATION, WITH LONG-TERM CURRENT USE OF INSULIN (HCC): ICD-10-CM

## 2025-05-27 DIAGNOSIS — E11.65 TYPE 2 DIABETES MELLITUS WITH HYPERGLYCEMIA, WITHOUT LONG-TERM CURRENT USE OF INSULIN (HCC): Primary | ICD-10-CM

## 2025-05-27 DIAGNOSIS — E11.9 TYPE 2 DIABETES MELLITUS WITHOUT COMPLICATION, UNSPECIFIED WHETHER LONG TERM INSULIN USE (HCC): ICD-10-CM

## 2025-05-27 PROCEDURE — PBNCHG PB NO CHARGE PLACEHOLDER: Performed by: PHARMACIST

## 2025-05-27 RX ORDER — INSULIN GLARGINE 100 [IU]/ML
10 INJECTION, SOLUTION SUBCUTANEOUS DAILY
Qty: 3 ML | Refills: 0 | Status: SHIPPED | OUTPATIENT
Start: 2025-05-27 | End: 2025-06-26

## 2025-05-27 NOTE — ASSESSMENT & PLAN NOTE
Lab Results   Component Value Date    HGBA1C 5.9 05/22/2025       Goal A1c <6.5% .   On Additional Therapies:  Statin: no- not indication for primary prevention at this time due to age <39 yo  ACEI/ARB: no  Glucose control on CGM report  Within goal range of 70 - 180 mg/dL 83% of time (goal >70% of time)  Average glucose 153 mg/dL (goal <154 mg/dL)  GMI 7.0%      Assessment:  A1c has improved to 5.9!  He is tolerating Ozempic 1 mg weekly without any side effects.  Glucose readings remain well controlled even with lower dose of Lantus.     Medication Regimen:  Ozempic: Use up 4 remaining doses of Ozempic 1 mg weekly then increase Ozempic to 2 mg weekly  Lantus: Discontinue when Ozempic is increased to 2 mg weekly.  Metformin: Continue 500 mg twice daily    Orders:    semaglutide, 2 mg/dose, (Ozempic) 8 mg/ mL injection pen; Inject 0.75 mL (2 mg total) under the skin every 7 days

## 2025-05-27 NOTE — PROGRESS NOTES
[facility]  Divya Jackson    Administrative Statements   Encounter provider Divya Jackson    The Patient is located at Other and in the following state in which I hold an active license [place].    The patient was identified by name and date of birth. Ted Mayen was informed that this is a telemedicine visit and that the visit is being conducted through the Epic Embedded platform. He agrees to proceed..  My office door was closed. No one else was in the room.  He acknowledged consent and understanding of privacy and security of the video platform. The patient has agreed to participate and understands they can discontinue the visit at any time.    Assessment/ Plan     Assessment & Plan  Type 2 diabetes mellitus with hyperglycemia, without long-term current use of insulin ([facility])    Lab Results   Component Value Date    HGBA1C 5.9 05/22/2025       Goal A1c <6.5% .   On Additional Therapies:  Statin: no- not indication for primary prevention at this time due to age <39 yo  ACEI/ARB: no  Glucose control on CGM report  Within goal range of 70 - 180 mg/dL 83% of time (goal >70% of time)  Average glucose 153 mg/dL (goal <154 mg/dL)  GMI 7.0%      Assessment:  A1c has improved to 5.9!  He is tolerating Ozempic 1 mg weekly without any side effects.  Glucose readings remain well controlled even with lower dose of Lantus.     Medication Regimen:  Ozempic: Use up 4 remaining doses of Ozempic 1 mg weekly then increase Ozempic to 2 mg weekly  Lantus: Discontinue when Ozempic is increased to 2 mg weekly.  Metformin: Continue 500 mg twice daily    Orders:    semaglutide, 2 mg/dose, (Ozempic) 8 mg/ mL injection pen; Inject 0.75 mL (2 mg total) under the skin every 7 days      Follow-up: 10 weeks     Subjective   Appointment today is to address type 2 diabetes.  Earlier this month he was in a ER with hyperglycemia and he was not taking his metformin or Lantus. Stopped due to  psychosocial barriers and compliance.  Medication regimen includes metformin, Lantus, and Ozempic. He is trying to lose weight through diet and exercise.  Last visit his Ozempic was increased to 1 mg weekly. PCP decreased his Lantus to 10 units daily        Medication Adherence/ Tolerability/ Cost:  Patient denies side effects, no issues with cost, 0 missed doses in last two week  Alcohol Swabs Pads  BD Pen Needle Danette 2nd Gen Misc  FreeStyle Irina 3 Sensor Misc  Insulin Glargine Solostar Sopn- 10 units daily  metFORMIN 500 mg BID with food   OneTouch Delica Lancets 33G Misc  OneTouch Verio Reflect Kit  OneTouch Verio Strp  Ozempic- 1 mg weekly - tolerating without side effects. He has done 4 injections of the 1 mg weekly.  He just got a new pen refilled at the pharmacy so he has an additional month left of the current dose    Previous Medications      Review of Systems   Gastrointestinal: Negative.  Negative for abdominal pain, constipation, diarrhea, nausea and vomiting.   Endocrine: Negative.         2. Lifestyle:   He is working on diet and lifestyle interventions including limiting portions choosing better options     3. Home monitoring devices  Glucometer: Yes, Brand: One touch verio reflect   Continuous Glucose Monitor: No, Brand:     Objective         ASCVD Risk:  The ASCVD Risk score (Carmela DK, et al., 2019) failed to calculate for the following reasons:    The 2019 ASCVD risk score is only valid for ages 40 to 79     Vitals:  There were no vitals filed for this visit.    Eye Exam:    Lab Results   Component Value Date    LEFTDIABRET None 06/02/2023    RIGHTDIABRET None 06/02/2023       Labs:  Lab Results   Component Value Date    SODIUM 139 02/08/2025    K 4.3 02/08/2025    EGFR 116 02/08/2025    CREATININE 0.93 02/08/2025    GLUF 105 (H) 09/07/2023    MICROALBCRE  02/14/2025      Comment:      Unable to calculate.       Lab Results   Component Value Date    HGBA1C 5.9 05/22/2025    HGBA1C 12.9 (A)  02/14/2025    HGBA1C 8.8 (H) 07/03/2023         Pharmacist Tracking Tool     Pharmacist Tracking Tool  Reason For Outreach: Embedded Pharmacist  Demographics:  Intervention Method: Video  Type of Intervention: Follow-Up  Topics Addressed: Diabetes  Pharmacologic Interventions: Medication Discontinuation and Dose or Frequency Adjusted  Non-Pharmacologic Interventions: Disease state education, Home Monitoring, Medication/Device education, and Personal CGM  Time:  Direct Patient Care: 10 mins  Care Coordination: 10 mins  Recommendation Recipient: Patient/Caregiver and Provider  Outcome: Accepted

## 2025-06-23 ENCOUNTER — TELEPHONE (OUTPATIENT)
Dept: PSYCHIATRY | Facility: CLINIC | Age: 25
End: 2025-06-23

## 2025-06-23 NOTE — TELEPHONE ENCOUNTER
Forms sent via [employer].    Patient is aware that forms should be completed via [employer] prior to appt.

## 2025-07-01 ENCOUNTER — OFFICE VISIT (OUTPATIENT)
Dept: BEHAVIORAL/MENTAL HEALTH CLINIC | Facility: CLINIC | Age: 25
End: 2025-07-01
Payer: COMMERCIAL

## 2025-07-01 DIAGNOSIS — F06.4 ANXIETY DISORDER DUE TO MEDICAL CONDITION: ICD-10-CM

## 2025-07-01 DIAGNOSIS — F32.9 REACTIVE DEPRESSION: Primary | ICD-10-CM

## 2025-07-01 PROCEDURE — 90837 PSYTX W PT 60 MINUTES: CPT | Performed by: SOCIAL WORKER
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
## 2025-07-01 NOTE — BH TREATMENT PLAN
Outpatient Behavioral Health Psychotherapy Treatment Plan    Ted Mayen  2000     Date of Initial Psychotherapy Assessment: 07/01/2025  Date of Current Treatment Plan: 07/01/25  Treatment Plan Target Date: 12/24/2025  Treatment Plan Expiration Date: 12/24/2025    Diagnosis:   1. 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     Stage of Change: Preparation    Target Date for completion: 12/24/2025     Anticipated therapeutic modalities: Mindfulness, CBT, solution focused therapy     People identified to complete this goal: myself with the help of my therapist      Objective 1: (identify the means of measuring success in meeting the objective): If the symptoms do arise I will be able to keep them at a minimum.       Objective 2: (identify the means of measuring success in meeting the objective):       Long Term Goal 2 (in the client's own words): I need to work on my overall anger and frustration    Stage of Change: Preparation    Target Date for completion: 12/24/2025     Anticipated therapeutic modalities: Mindfulness, CBT and anger management strategies.      People identified to complete this goal: myself with the help of my therapist.       Objective 1: (identify the means of measuring success in meeting the objective): I will be less reactionary when angry and or frustrated.       Objective 2: (identify the means of measuring success in meeting the objective):      Long Term Goal 3 (in the client's own words): I need to work on being less isolated and more socially engaged.     Stage of Change: Preparation    Target Date for completion: 12/24/2025     Anticipated therapeutic modalities: we will work on promoting and developing social skills     People  identified to complete this goal: myself with the help of my therapist.       Objective 1: (identify the means of measuring success in meeting the objective): I will interact more and isolate less      Objective 2: (identify the means of measuring success in meeting the objective):      I am currently under the care of a [facility] psychiatric provider: no    My [facility] psychiatric provider is: I prefer to start with talk therapy first.     I am currently taking psychiatric medications: No    I feel that I will be ready for discharge from mental health care when I reach the following (measurable goal/objective): When I make the progress I am comfortable with.     For children and adults who have a legal guardian:   Has there been any change to custody orders and/or guardianship status? N/A. If yes, attach updated documentation.    I have created my Crisis Plan and have been offered a copy of this plan    Behavioral Health Treatment Plan [facility]: Diagnosis and Treatment Plan explained to Ted Mayen acknowledges an understanding of their diagnosis. Ted Mayen agrees to this treatment plan.    I have been offered a copy of this Treatment Plan. yes

## 2025-07-12 DIAGNOSIS — E11.65 TYPE 2 DIABETES MELLITUS WITH HYPERGLYCEMIA, WITHOUT LONG-TERM CURRENT USE OF INSULIN (HCC): ICD-10-CM

## 2025-07-12 DIAGNOSIS — E11.9 TYPE 2 DIABETES MELLITUS WITHOUT COMPLICATION, UNSPECIFIED WHETHER LONG TERM INSULIN USE (HCC): ICD-10-CM

## 2025-07-29 ENCOUNTER — TELEMEDICINE (OUTPATIENT)
Dept: FAMILY MEDICINE CLINIC | Facility: CLINIC | Age: 25
End: 2025-07-29

## 2025-07-29 DIAGNOSIS — E11.65 TYPE 2 DIABETES MELLITUS WITH HYPERGLYCEMIA, WITHOUT LONG-TERM CURRENT USE OF INSULIN (HCC): Primary | ICD-10-CM

## 2025-07-29 PROCEDURE — PBNCHG PB NO CHARGE PLACEHOLDER: Performed by: PHARMACIST

## 2025-08-05 ENCOUNTER — SOCIAL WORK (OUTPATIENT)
Dept: BEHAVIORAL/MENTAL HEALTH CLINIC | Facility: CLINIC | Age: 25
End: 2025-08-05
Payer: COMMERCIAL

## 2025-08-05 DIAGNOSIS — F06.4 ANXIETY DISORDER DUE TO MEDICAL CONDITION: ICD-10-CM

## 2025-08-05 DIAGNOSIS — F32.9 REACTIVE DEPRESSION: Primary | ICD-10-CM

## 2025-08-05 PROCEDURE — 90847 FAMILY PSYTX W/PT 50 MIN: CPT | Performed by: SOCIAL WORKER

## 2025-08-22 ENCOUNTER — OFFICE VISIT (OUTPATIENT)
Dept: FAMILY MEDICINE CLINIC | Facility: CLINIC | Age: 25
End: 2025-08-22
Payer: COMMERCIAL

## 2025-08-22 VITALS
SYSTOLIC BLOOD PRESSURE: 110 MMHG | WEIGHT: 227.2 LBS | HEIGHT: 72 IN | HEART RATE: 104 BPM | TEMPERATURE: 97.6 F | DIASTOLIC BLOOD PRESSURE: 70 MMHG | BODY MASS INDEX: 30.77 KG/M2 | OXYGEN SATURATION: 98 %

## 2025-08-22 DIAGNOSIS — E11.9 TYPE 2 DIABETES MELLITUS WITHOUT COMPLICATION, UNSPECIFIED WHETHER LONG TERM INSULIN USE (HCC): ICD-10-CM

## 2025-08-22 DIAGNOSIS — E11.65 TYPE 2 DIABETES MELLITUS WITH HYPERGLYCEMIA, WITHOUT LONG-TERM CURRENT USE OF INSULIN (HCC): ICD-10-CM

## 2025-08-22 DIAGNOSIS — Z11.4 SCREENING FOR HIV (HUMAN IMMUNODEFICIENCY VIRUS): Primary | ICD-10-CM

## 2025-08-22 LAB — SL AMB POCT HEMOGLOBIN AIC: 5.3 (ref ?–6.5)

## 2025-08-22 PROCEDURE — 83036 HEMOGLOBIN GLYCOSYLATED A1C: CPT | Performed by: FAMILY MEDICINE

## 2025-08-22 PROCEDURE — 99214 OFFICE O/P EST MOD 30 MIN: CPT | Performed by: FAMILY MEDICINE
